# Patient Record
Sex: FEMALE | Race: BLACK OR AFRICAN AMERICAN | NOT HISPANIC OR LATINO | Employment: UNEMPLOYED | URBAN - METROPOLITAN AREA
[De-identification: names, ages, dates, MRNs, and addresses within clinical notes are randomized per-mention and may not be internally consistent; named-entity substitution may affect disease eponyms.]

---
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
## 2021-06-02 ENCOUNTER — RECORDS - HEALTHEAST (OUTPATIENT)
Dept: ADMINISTRATIVE | Facility: CLINIC | Age: 21
End: 2021-06-02

## 2021-06-13 NOTE — PROGRESS NOTES
"S:  19 y/o female presented to the [facility] ED with SI and psychosis.    B: No known hx of MH.  BIB friends due to pt making suicidal statements and altered mental status.  Denied substance use.  Collateral obtained from friends and they reported it is unknown if she used substances and that pt endorsed visual and auditory hallucinations.  Pt is extremely paranoid and has made statements about the federal government being \"out to get her.\"  Pt has been spitting, kicking and screaming, requiring the use of IM medications and restraints for safety.      A: 72 hr hold.  No medical concerns. COVID has been collected.  No reported sx.  Drug screen has been ordered.  Negative for alcohol.     R: CSW recommends re-evaluation once pt is able to participate and drug screen to rule out substance use, if possible.  Placed on waitlist pending bed availability.  "

## 2021-06-16 PROBLEM — F39: Status: ACTIVE | Noted: 2020-11-27

## 2021-06-16 PROBLEM — R45.851 SUICIDAL IDEATION: Status: ACTIVE | Noted: 2020-11-24

## 2021-06-16 PROBLEM — F29 PSYCHOSIS (H): Status: ACTIVE | Noted: 2020-11-24

## 2021-08-24 ENCOUNTER — HOSPITAL ENCOUNTER (EMERGENCY)
Facility: CLINIC | Age: 21
Discharge: HOME | End: 2021-08-24
Admitting: EMERGENCY MEDICINE
Payer: COMMERCIAL

## 2021-08-24 VITALS
OXYGEN SATURATION: 98 % | SYSTOLIC BLOOD PRESSURE: 109 MMHG | DIASTOLIC BLOOD PRESSURE: 68 MMHG | BODY MASS INDEX: 25.1 KG/M2 | HEART RATE: 94 BPM | WEIGHT: 170 LBS | TEMPERATURE: 97 F | RESPIRATION RATE: 16 BRPM

## 2021-08-24 DIAGNOSIS — N64.52 BREAST DISCHARGE: ICD-10-CM

## 2021-08-24 LAB
HCG UR QL: NEGATIVE
INTERNAL QC OK POCT: NORMAL

## 2021-08-24 PROCEDURE — 81025 URINE PREGNANCY TEST: CPT | Performed by: EMERGENCY MEDICINE

## 2021-08-24 PROCEDURE — 99282 EMERGENCY DEPT VISIT SF MDM: CPT

## 2021-08-24 NOTE — ED TRIAGE NOTES
The patient presents to the ED because she thinks she might be pregnant. Last menstrual period was in February. She reports milk coming out from her breasts. She has no concerns other than possible pregnancy at this time.

## 2021-08-25 NOTE — ED PROVIDER NOTES
EMERGENCY DEPARTMENT ENCOUNTER      NAME: Rome Jensen  AGE: 21 year old female  YOB: 2000  MRN: [id number]  EVALUATION DATE & TIME: 8/24/2021  7:15 PM    PCP: No primary care provider on file.    ED PROVIDER: Osiris Rodriguez PA-C      Chief Complaint   Patient presents with     Pregnancy Possible (Cpm)         FINAL IMPRESSION:  1. Breast discharge          ED COURSE & MEDICAL DECISION MAKING:    Pertinent Labs & Imaging studies reviewed. (See chart for details)    21 year old female presents to the Emergency Department for evaluation of nipple discharge.    Physical exam is remarkable for a generally well-appearing female who is in no acute distress.  She has an unremarkable breast exam with no tenderness to palpation, masses, erythema, or warmth.  I do not appreciate any discharge from either nipple on my exam.  Abdomen is soft and nontender.  Vital signs are stable and she is afebrile.    Pregnancy test was negative.    I do not think any further emergent labs or imaging are indicated at this time.  Patient had multiple negative pregnancy test at home and her test here is negative.  Patient's mother requesting a blood pregnancy test which I advised her is not emergently indicated given multiple other negative tests.  I advised the patient that she needs to follow-up with her primary care provider as soon as possible as I suspect a likely endocrine cause of nipple discharge given those symptoms as well as her amenorrhea.  Recommend return to the ER if she develops any new or worsening symptoms like severe pain, fever, persistent vomiting, difficulty breathing, or any other concerning symptoms.  Patient is amenable to this treatment plan and verbalized her understanding.    ED Course   7:19 PM Performed my initial history and physical exam. Discussed workup in the emergency department, management of symptoms, and likely disposition.  7:40 PM I discussed the plan for discharge with the patient, and  patient is agreeable. We discussed supportive cares at home and reasons for return to the ER including new or worsening symptoms - all questions and concerns addressed. Patient to be discharged by RN.    At the conclusion of the encounter I discussed the results of all of the tests and the disposition. The questions were answered. The patient or family acknowledged understanding and was agreeable with the care plan.     Voice recognition software was used in the creation of this note. Any grammatical or nonsensical errors are due to inherent errors with the software and are not the intention of the writer.     MEDICATIONS GIVEN IN THE EMERGENCY:  Medications - No data to display    NEW PRESCRIPTIONS STARTED AT TODAY'S ER VISIT  Discharge Medication List as of 8/24/2021  7:44 PM               =================================================================    HPI    Patient information was obtained from: Patient    Use of Intrepreter: N/A         Rome Jensen is a 21 year old female with a history of asthma, bipolar disorder, and psychosis who presents to the ED by walk in for evaluation of possible pregnancy.    The patient presents with concern she may be pregnant. She reports she has not had her period since February. She states having an irregular period is abnormal for her. She has taken three at-home pregnancy tests with negative results. She also reports yellowish breast discharge from her bilateral nipples. The discharge has been occurring for the past couple days. She reports no history of similar discharge. She denies any other medical problems. She denies breast pain, fever, chills, shortness of breath, chest pain, or any other complaints at this time.    REVIEW OF SYSTEMS   Constitutional:  No reported fever, chills  Respiratory: No reported SOB, cough  Cardiovascular:  No reported CP  GI:  No reported abdominal pain, nausea, vomiting  : Denies vaginal bleeding  Skin:  No reported rash, breast pain.  Endorses nipple discharge     All other systems reviewed and are negative unless noted in HPI.      PAST MEDICAL HISTORY:  Past Medical History:   Diagnosis Date     Asthma        PAST SURGICAL HISTORY:  History reviewed. No pertinent surgical history.    CURRENT MEDICATIONS:    albuterol (PROAIR HFA;PROVENTIL HFA;VENTOLIN HFA) 90 mcg/actuation inhaler  ascorbic acid, vitamin C, (ASCORBIC ACID WITH MERY HIPS) 500 MG tablet  cholecalciferol, vitamin D3, 1,000 unit (25 mcg) tablet  fluticasone furoate-vilanteroL (BREO ELLIPTA) 100-25 mcg/dose inhaler  melatonin 12 mg Tab  multivitamin with minerals (THERA-M) 9 mg iron-400 mcg Tab tablet  QUEtiapine (SEROQUEL) 25 MG tablet  risperiDONE (RISPERDAL) 3 MG tablet        ALLERGIES:  Allergies   Allergen Reactions     Amoxicillin Hives       FAMILY HISTORY:  History reviewed. No pertinent family history.    SOCIAL HISTORY:   Social History     Socioeconomic History     Marital status: Single     Spouse name: Not on file     Number of children: Not on file     Years of education: Not on file     Highest education level: Not on file   Occupational History     Not on file   Tobacco Use     Smoking status: Never Smoker     Smokeless tobacco: Never Used   Substance and Sexual Activity     Alcohol use: Yes     Alcohol/week: 19.0 standard drinks     Drug use: Yes     Types: Marijuana     Sexual activity: Yes     Partners: Male     Birth control/protection: None   Other Topics Concern     Not on file   Social History Narrative     Not on file     Social Determinants of Health     Financial Resource Strain:      Difficulty of Paying Living Expenses:    Food Insecurity:      Worried About Running Out of Food in the Last Year:      Ran Out of Food in the Last Year:    Transportation Needs:      Lack of Transportation (Medical):      Lack of Transportation (Non-Medical):    Physical Activity:      Days of Exercise per Week:      Minutes of Exercise per Session:    Stress:      Feeling  of Stress :    Social Connections:      Frequency of Communication with Friends and Family:      Frequency of Social Gatherings with Friends and Family:      Attends [employer] Services:      Active Member of Clubs or Organizations:      Attends Club or Organization Meetings:      Marital Status:    Intimate Partner Violence:      Fear of Current or Ex-Partner:      Emotionally Abused:      Physically Abused:      Sexually Abused:        VITALS:  Patient Vitals for the past 24 hrs:   BP Temp Pulse Resp SpO2 Weight   08/24/21 1758 109/68 97  F (36.1  C) 94 16 98 % 77.1 kg (170 lb)       PHYSICAL EXAM    VITAL SIGNS: /68   Pulse 94   Temp 97  F (36.1  C)   Resp 16   Wt 77.1 kg (170 lb)   LMP 02/05/2021   SpO2 98%   BMI 25.10 kg/m    General Appearance: Alert, cooperative, normal speech and facial symmetry, appears stated age, the patient does not appear in distress  Head:  Normocephalic, without obvious abnormality, atraumatic  Chest:  Unremarkable breast exam with no tenderness to palpation, masses, erythema, or warmth bilaterally.  No discharge from either nipple on my exam even with expression.  Abdomen:  Abdomen is soft, non-distended with no tenderness to palpation, rebound tenderness, or guarding.   Neuro: Patient is awake, alert, and responsive to voice. No gross motor weaknesses or sensory loss; moves all extremities.     LAB:  All pertinent labs reviewed and interpreted.  Labs Ordered and Resulted from Time of ED Arrival Up to the Time of Departure from the ED   HCG QUALITATIVE URINE POCT - Normal       RADIOLOGY:  Reviewed all pertinent imaging. Please see official radiology report.  No orders to display       De ESTEBAN, am serving as a scribe to document services personally performed by Osiris Rodriguez PA-C based on my observation and the provider's statements to me. IOsiris PA-C attest that De Ocampo is acting in a scribe capacity, has observed my performance of the  services and has documented them in accordance with my direction.     Osiris Rodriguez PA-C  Emergency Medicine  [facility] EMERGENCY ROOM  [address] [id number] [phone]  Dept: [phone]     Osiris Rodriguez PA-C  08/24/21 2024

## 2021-08-25 NOTE — DISCHARGE INSTRUCTIONS
You were seen in the emergency department today for evaluation of concern for possible pregnancy.  Your pregnancy test was negative today.    I do not believe your breast discharge is related to pregnancy.  Follow-up with your primary care provider to have lab work done as there are many other possible causes of breast discharge.    Return to the ER if you develop any new or worsening symptoms like fever, persistent vomiting, severe breast pain, or any other symptoms that concern you.

## 2021-11-13 ENCOUNTER — HOSPITAL ENCOUNTER (EMERGENCY)
Facility: CLINIC | Age: 21
Discharge: HOME | End: 2021-11-14
Attending: EMERGENCY MEDICINE
Payer: COMMERCIAL

## 2021-11-13 ENCOUNTER — APPOINTMENT (OUTPATIENT)
Dept: GENERAL RADIOLOGY | Facility: CLINIC | Age: 21
End: 2021-11-13
Attending: EMERGENCY MEDICINE
Payer: COMMERCIAL

## 2021-11-13 DIAGNOSIS — S09.90XA CLOSED HEAD INJURY, INITIAL ENCOUNTER: ICD-10-CM
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
## 2021-11-13 DIAGNOSIS — S43.102A ACROMIOCLAVICULAR SEPARATION, TYPE 1, LEFT, INITIAL ENCOUNTER: ICD-10-CM

## 2021-11-13 PROCEDURE — 99284 EMERGENCY DEPT VISIT MOD MDM: CPT

## 2021-11-13 PROCEDURE — 73030 X-RAY EXAM OF SHOULDER: CPT | Mod: LT

## 2021-11-13 PROCEDURE — 99284 EMERGENCY DEPT VISIT MOD MDM: CPT | Performed by: EMERGENCY MEDICINE

## 2021-11-14 ENCOUNTER — APPOINTMENT (OUTPATIENT)
Dept: GENERAL RADIOLOGY | Facility: CLINIC | Age: 21
End: 2021-11-14
Attending: EMERGENCY MEDICINE
Payer: COMMERCIAL

## 2021-11-14 VITALS
SYSTOLIC BLOOD PRESSURE: 110 MMHG | RESPIRATION RATE: 18 BRPM | BODY MASS INDEX: 25.84 KG/M2 | OXYGEN SATURATION: 98 % | HEART RATE: 85 BPM | WEIGHT: 175 LBS | TEMPERATURE: 98.5 F | DIASTOLIC BLOOD PRESSURE: 74 MMHG

## 2021-11-14 PROCEDURE — 73090 X-RAY EXAM OF FOREARM: CPT | Mod: RT

## 2021-11-14 PROCEDURE — 250N000013 HC RX MED GY IP 250 OP 250 PS 637: Performed by: EMERGENCY MEDICINE

## 2021-11-14 RX ORDER — ACETAMINOPHEN 500 MG
1000 TABLET ORAL ONCE
Status: COMPLETED | OUTPATIENT
Start: 2021-11-14 | End: 2021-11-14

## 2021-11-14 RX ADMIN — ACETAMINOPHEN 1000 MG: 500 TABLET ORAL at 00:48
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
## 2022-09-30 ENCOUNTER — APPOINTMENT (OUTPATIENT)
Dept: GENERAL RADIOLOGY | Facility: CLINIC | Age: 22
End: 2022-09-30
Attending: EMERGENCY MEDICINE
Payer: COMMERCIAL

## 2022-09-30 ENCOUNTER — APPOINTMENT (OUTPATIENT)
Dept: CT IMAGING | Facility: CLINIC | Age: 22
End: 2022-09-30
Payer: COMMERCIAL

## 2022-09-30 ENCOUNTER — HOSPITAL ENCOUNTER (EMERGENCY)
Facility: CLINIC | Age: 22
Discharge: OTHER INSTITUTION | End: 2022-09-30
Attending: EMERGENCY MEDICINE | Admitting: EMERGENCY MEDICINE
Payer: COMMERCIAL

## 2022-09-30 ENCOUNTER — APPOINTMENT (OUTPATIENT)
Dept: CT IMAGING | Facility: CLINIC | Age: 22
End: 2022-09-30
Attending: EMERGENCY MEDICINE
Payer: COMMERCIAL

## 2022-09-30 ENCOUNTER — HOSPITAL ENCOUNTER (EMERGENCY)
Facility: CLINIC | Age: 22
Discharge: HOME | End: 2022-10-01
Attending: INTERNAL MEDICINE | Admitting: INTERNAL MEDICINE
Payer: COMMERCIAL

## 2022-09-30 VITALS
TEMPERATURE: 98.6 F | HEART RATE: 62 BPM | OXYGEN SATURATION: 98 % | RESPIRATION RATE: 14 BRPM | BODY MASS INDEX: 23.11 KG/M2 | SYSTOLIC BLOOD PRESSURE: 112 MMHG | HEIGHT: 69 IN | DIASTOLIC BLOOD PRESSURE: 68 MMHG | WEIGHT: 156 LBS

## 2022-09-30 VITALS
OXYGEN SATURATION: 99 % | HEART RATE: 78 BPM | SYSTOLIC BLOOD PRESSURE: 108 MMHG | RESPIRATION RATE: 16 BRPM | DIASTOLIC BLOOD PRESSURE: 76 MMHG

## 2022-09-30 DIAGNOSIS — S02.609A: ICD-10-CM

## 2022-09-30 DIAGNOSIS — R07.89 OTHER CHEST PAIN: ICD-10-CM

## 2022-09-30 DIAGNOSIS — S01.511A LIP LACERATION, INITIAL ENCOUNTER: ICD-10-CM
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
## 2022-09-30 DIAGNOSIS — S09.93XA DENTAL TRAUMA, INITIAL ENCOUNTER: ICD-10-CM
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
## 2022-09-30 LAB
HOLD SPECIMEN: NORMAL
TROPONIN T SERPL HS-MCNC: <6 NG/L

## 2022-09-30 PROCEDURE — 84484 ASSAY OF TROPONIN QUANT: CPT | Performed by: EMERGENCY MEDICINE

## 2022-09-30 PROCEDURE — 70450 CT HEAD/BRAIN W/O DYE: CPT

## 2022-09-30 PROCEDURE — 71046 X-RAY EXAM CHEST 2 VIEWS: CPT

## 2022-09-30 PROCEDURE — 99285 EMERGENCY DEPT VISIT HI MDM: CPT | Mod: 25

## 2022-09-30 PROCEDURE — 99285 EMERGENCY DEPT VISIT HI MDM: CPT | Mod: 25 | Performed by: INTERNAL MEDICINE

## 2022-09-30 PROCEDURE — 70486 CT MAXILLOFACIAL W/O DYE: CPT

## 2022-09-30 PROCEDURE — 36415 COLL VENOUS BLD VENIPUNCTURE: CPT | Performed by: EMERGENCY MEDICINE

## 2022-09-30 PROCEDURE — 71046 X-RAY EXAM CHEST 2 VIEWS: CPT | Mod: 26 | Performed by: RADIOLOGY

## 2022-09-30 PROCEDURE — 93010 ELECTROCARDIOGRAM REPORT: CPT | Performed by: INTERNAL MEDICINE

## 2022-09-30 PROCEDURE — 250N000013 HC RX MED GY IP 250 OP 250 PS 637: Performed by: EMERGENCY MEDICINE

## 2022-09-30 PROCEDURE — 93005 ELECTROCARDIOGRAM TRACING: CPT | Performed by: INTERNAL MEDICINE

## 2022-09-30 RX ORDER — ACETAMINOPHEN 325 MG/1
975 TABLET ORAL ONCE
Status: COMPLETED | OUTPATIENT
Start: 2022-09-30 | End: 2022-09-30

## 2022-09-30 RX ORDER — CLINDAMYCIN HCL 300 MG
300 CAPSULE ORAL 4 TIMES DAILY
Qty: 20 CAPSULE | Refills: 0 | Status: SHIPPED | OUTPATIENT
Start: 2022-09-30 | End: 2022-10-05

## 2022-09-30 RX ORDER — LORAZEPAM 0.5 MG/1
0.5 TABLET ORAL ONCE
Status: DISCONTINUED | OUTPATIENT
Start: 2022-09-30 | End: 2022-09-30 | Stop reason: HOSPADM

## 2022-09-30 RX ORDER — HYDROCODONE BITARTRATE AND ACETAMINOPHEN 5; 325 MG/1; MG/1
1 TABLET ORAL EVERY 6 HOURS PRN
Qty: 12 TABLET | Refills: 0 | Status: SHIPPED | OUTPATIENT
Start: 2022-09-30 | End: 2022-10-03

## 2022-09-30 RX ORDER — CHLORHEXIDINE GLUCONATE ORAL RINSE 1.2 MG/ML
15 SOLUTION DENTAL 3 TIMES DAILY
Qty: 473 ML | Refills: 0 | Status: SHIPPED | OUTPATIENT
Start: 2022-09-30

## 2022-09-30 RX ORDER — ONDANSETRON 4 MG/1
4 TABLET, ORALLY DISINTEGRATING ORAL ONCE
Status: DISCONTINUED | OUTPATIENT
Start: 2022-09-30 | End: 2022-09-30 | Stop reason: HOSPADM

## 2022-09-30 RX ADMIN — ACETAMINOPHEN 975 MG: 325 TABLET, FILM COATED ORAL at 11:45

## 2022-09-30 ASSESSMENT — ENCOUNTER SYMPTOMS
VOMITING: 0
SHORTNESS OF BREATH: 0
BACK PAIN: 0
FEVER: 0
NECK PAIN: 0
NUMBNESS: 0
SHORTNESS OF BREATH: 0
ABDOMINAL PAIN: 0
WOUND: 1
WEAKNESS: 0
HEADACHES: 0
WEAKNESS: 0
CONFUSION: 0
ABDOMINAL PAIN: 0
NECK PAIN: 0
LIGHT-HEADEDNESS: 0
BACK PAIN: 0
NUMBNESS: 0
NAUSEA: 1

## 2022-09-30 ASSESSMENT — ACTIVITIES OF DAILY LIVING (ADL)
ADLS_ACUITY_SCORE: 35
ADLS_ACUITY_SCORE: 35
ADLS_ACUITY_SCORE: 33
ADLS_ACUITY_SCORE: 35
ADLS_ACUITY_SCORE: 35

## 2022-09-30 NOTE — ED NOTES
Pt updated continuing to wait for call back from specialist regarding plan for surgical intervention today or not. Pt has been able to swallow water.  Pt continues to refuse medications and tetanus
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
## 2022-09-30 NOTE — ED NOTES
Pt refuses to swallow anything saying she is unable.  Pt given straw and talked about placing straw back further in the mouth due to dental injury but states she is unable.  Update to PA given.
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
## 2022-10-01 LAB
ATRIAL RATE - MUSE: 71 BPM
DIASTOLIC BLOOD PRESSURE - MUSE: NORMAL MMHG
INTERPRETATION ECG - MUSE: NORMAL
P AXIS - MUSE: 47 DEGREES
PR INTERVAL - MUSE: 146 MS
QRS DURATION - MUSE: 82 MS
QT - MUSE: 392 MS
QTC - MUSE: 425 MS
R AXIS - MUSE: 63 DEGREES
SYSTOLIC BLOOD PRESSURE - MUSE: NORMAL MMHG
T AXIS - MUSE: 31 DEGREES
VENTRICULAR RATE- MUSE: 71 BPM
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
## 2022-12-20 ENCOUNTER — HOSPITAL ENCOUNTER (EMERGENCY)
Facility: CLINIC | Age: 22
Discharge: LEFT BEFORE DISCHARGE | End: 2022-12-20
Attending: EMERGENCY MEDICINE

## 2022-12-20 VITALS
WEIGHT: 150 LBS | RESPIRATION RATE: 16 BRPM | SYSTOLIC BLOOD PRESSURE: 122 MMHG | TEMPERATURE: 98.2 F | OXYGEN SATURATION: 98 % | HEIGHT: 69 IN | DIASTOLIC BLOOD PRESSURE: 81 MMHG | HEART RATE: 92 BPM | BODY MASS INDEX: 22.22 KG/M2

## 2022-12-20 DIAGNOSIS — Z53.21 PATIENT LEFT WITHOUT BEING SEEN: ICD-10-CM

## 2022-12-20 ASSESSMENT — ACTIVITIES OF DAILY LIVING (ADL): ADLS_ACUITY_SCORE: 33
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
## 2022-12-21 NOTE — ED PROVIDER NOTES
Patient left prior to my evaluation.  Please disregard encounter     Croy Alva MD  12/21/22 8587

## 2023-12-02 ENCOUNTER — HOSPITAL ENCOUNTER (EMERGENCY)
Facility: CLINIC | Age: 23
Discharge: HOME | End: 2023-12-02
Attending: EMERGENCY MEDICINE | Admitting: EMERGENCY MEDICINE
Payer: COMMERCIAL

## 2023-12-02 VITALS
WEIGHT: 155 LBS | OXYGEN SATURATION: 100 % | TEMPERATURE: 98.4 F | DIASTOLIC BLOOD PRESSURE: 83 MMHG | BODY MASS INDEX: 22.96 KG/M2 | SYSTOLIC BLOOD PRESSURE: 129 MMHG | RESPIRATION RATE: 16 BRPM | HEART RATE: 98 BPM | HEIGHT: 69 IN

## 2023-12-02 DIAGNOSIS — J06.9 UPPER RESPIRATORY TRACT INFECTION, UNSPECIFIED TYPE: ICD-10-CM

## 2023-12-02 LAB
FLUAV RNA SPEC QL NAA+PROBE: NEGATIVE
FLUBV RNA RESP QL NAA+PROBE: NEGATIVE
RSV RNA SPEC NAA+PROBE: NEGATIVE
SARS-COV-2 RNA RESP QL NAA+PROBE: NEGATIVE

## 2023-12-02 PROCEDURE — 99283 EMERGENCY DEPT VISIT LOW MDM: CPT

## 2023-12-02 PROCEDURE — 87637 SARSCOV2&INF A&B&RSV AMP PRB: CPT | Performed by: EMERGENCY MEDICINE

## 2023-12-02 ASSESSMENT — ACTIVITIES OF DAILY LIVING (ADL): ADLS_ACUITY_SCORE: 35

## 2023-12-02 NOTE — ED TRIAGE NOTES
Patient presents to ED with cough and fevers that began this morning @0400, patient also reports some anxiety and is taking her anxiety meds.  Naomi Romeo RN.......12/2/2023 8:53 AM     Triage Assessment (Adult)       Row Name 12/02/23 0852          Triage Assessment    Airway WDL WDL        Respiratory WDL    Respiratory WDL WDL        Skin Circulation/Temperature WDL    Skin Circulation/Temperature WDL WDL        Cardiac WDL    Cardiac WDL WDL        Peripheral/Neurovascular WDL    Peripheral Neurovascular WDL WDL        Cognitive/Neuro/Behavioral WDL    Cognitive/Neuro/Behavioral WDL WDL

## 2023-12-02 NOTE — ED PROVIDER NOTES
"EMERGENCY DEPARTMENT ENCOUNTER      NAME: Rome Jensen  AGE: 23 year old female  YOB: 2000  MRN: [id number]  EVALUATION DATE & TIME: 12/2/2023  8:37 AM    PCP: Avila Villagran    ED PROVIDER: Manoj Carrasco M.D.      Chief Complaint   Patient presents with    Anxiety    Cough         FINAL IMPRESSION:  URI      ED COURSE & MEDICAL DECISION MAKING:    Pertinent Labs & Imaging studies reviewed. (See chart for details)  23 year old female presents to the Emergency Department for evaluation of cough and general achiness.  Symptoms started in the early morning hours.  Also has been feeling \"hot and cold\".  Concerned it may represent a complication of a root canal earlier this week.  Review of records indicate patient with underlying anxiety issues.  Vital signs are unremarkable exam is benign.  Oropharynx is noninjected.  Neck is supple.  Lungs are clear.  Card exam unremarkable.  Will proceed with viral testing out of caution.  No indications for other laboratory evaluation nor imaging patient appears non toxic with stable vitals signs. Overall exam is benign.  Patient reports being unvaccinated for COVID/influenza    8:40 AM I met with the patient for the initial interview and physical examination. Discussed plan for treatment and workup in the ED.    9:51 AM.  Viral swab negative for COVID/RSV/influenza.  Patient likely with unspecified viral URI.  Patient reassured and discharged.  At the conclusion of the encounter I discussed the results of all of the tests and the disposition. The questions were answered and return precautions provided. The patient or family acknowledged understanding and was agreeable with the care plan.       PPE: Provider  paper mask.     Medical Decision Making    History:  Supplemental history from: N/A  External Record(s) reviewed: Documented in chart, if applicable.    Work Up:  Chart documentation includes differential considered and any EKGs or imaging independently " interpreted by provider, where specified.  In additional to work up documented, I considered the following work up: Documented in chart, if applicable.    External consultation:  Discussion of management with another provider:     Complicating factors:  Care impacted by chronic illness: Mental Health and Other: asthma  Care affected by social determinants of health: N/A    Disposition considerations: Discharge. No recommendations on prescription strength medication(s). See documentation for any additional details.       MEDICATIONS GIVEN IN THE EMERGENCY:  Medications - No data to display    NEW PRESCRIPTIONS STARTED AT TODAY'S ER VISIT  New Prescriptions    No medications on file          =================================================================    HPI    Patient information was obtained from: Patient    Use of Intrepreter: N/A         Rome Jensen is a 23 year old female with a pertient medical history of asthma, Bipolar 1 disorder, and psychosis who presents to the ED for evaluation of anxiety and cough.    Patient reports a cough and feeling hot and cold starting around 4 AM this morning. She notes that she had a root canal 3 days ago and could not take her antibiotics because she was allergic to them. She denies taking her temperature. No other complaints at this time.       REVIEW OF SYSTEMS   Constitutional:  Denies fever, chills  Respiratory:  Denies productive cough or increased work of breathing  Cardiovascular:  Denies chest pain, palpitations  GI:  Denies abdominal pain, nausea, vomiting, or change in bowel or bladder habits   Musculoskeletal:  Denies any new muscle/joint swelling  Skin:  Denies rash   Neurologic:  Denies focal weakness  All systems negative except as marked.     PAST MEDICAL HISTORY:  Past Medical History:   Diagnosis Date    Asthma        PAST SURGICAL HISTORY:  No past surgical history on file.      CURRENT MEDICATIONS:    No current facility-administered medications for this  encounter.    Current Outpatient Medications:     chlorhexidine (PERIDEX) 0.12 % solution, Swish and spit 15 mLs in mouth 3 times daily, Disp: 473 mL, Rfl: 0    ALLERGIES:  Allergies   Allergen Reactions    Amoxicillin Hives       FAMILY HISTORY:  No family history on file.    SOCIAL HISTORY:   Social History     Socioeconomic History    Marital status: Single   Tobacco Use    Smoking status: Every Day     Types: Vaping Device    Smokeless tobacco: Never   Substance and Sexual Activity    Alcohol use: Not Currently     Alcohol/week: 19.0 standard drinks of alcohol    Drug use: Not Currently     Types: Marijuana    Sexual activity: Yes     Partners: Male     Birth control/protection: None       VITALS:  No data found.     PHYSICAL EXAM    Constitutional:  Awake, alert, Anxious appearing.  HENT:  Normocephalic, Atraumatic. Bilateral external ears normal. Oropharynx moist. Nose normal. Neck- Normal range of motion with no guarding,  Supple, No stridor.   Eyes:  PERRL, EOMI with no signs of entrapment, Conjunctiva normal, No discharge.   Respiratory:  Normal breath sounds, No respiratory distress, No wheezing.    Cardiovascular:  Normal heart rate, Normal rhythm, No appreciable rubs or gallops.   GI:  Soft, No tenderness, No distension, No palpable masses  Musculoskeletal:  No edema. Good range of motion in all major joints. No tenderness to palpation or major deformities noted.  Integument:  Warm, Dry, No erythema, No rash.   Neurologic:  Alert & oriented, Normal motor function, Normal sensory function, No focal deficits noted.   Psychiatric:  Affect normal, Judgment normal, Mood normal.     LAB:  All pertinent labs reviewed and interpreted.  Results for orders placed or performed during the hospital encounter of 12/02/23   Symptomatic Influenza A/B, RSV, & SARS-CoV2 PCR (COVID-19) Nasopharyngeal     Status: Normal    Specimen: Nasopharyngeal; Swab   Result Value Ref Range    Influenza A PCR Negative Negative     Influenza B PCR Negative Negative    RSV PCR Negative Negative    SARS CoV2 PCR Negative Negative    Narrative    Testing was performed using the Xpert Xpress CoV2/Flu/RSV Assay on the [employer] GeneXpert Instrument. This test should be ordered for the detection of SARS-CoV-2, influenza, and RSV viruses in individuals who meet clinical and/or epidemiological criteria. Test performance is unknown in asymptomatic patients. This test is for in vitro diagnostic use under the FDA EUA for laboratories certified under CLIA to perform high or moderate complexity testing. This test has not been FDA cleared or approved. A negative result does not rule out the presence of PCR inhibitors in the specimen or target RNA in concentration below the limit of detection for the assay. If only one viral target is positive but coinfection with multiple targets is suspected, the sample should be re-tested with another FDA cleared, approved, or authorized test, if coinfection would change clinical management. This test was validated by the [facility]. These laboratories are certified under the Clinical Laboratory Improvement Amendments of 1988 (CLIA-88) as qualified to perform high complexity laboratory testing.               I, Roma Ibanez, am serving as a scribe to document services personally performed by Manoj Carrasco MD, based on my observation and the provider's statements to me. I, Manoj Carrasco MD attest that Roma Ibanez is acting in a scribe capacity, has observed my performance of the services and has documented them in accordance with my direction.    Manoj Carrasco M.D.  Emergency Medicine  [facility] EMERGENCY ROOM     Manoj Carrasco MD  12/02/23 0952

## 2023-12-02 NOTE — ED NOTES
Introduced self to patient.  Whiteboard updated.  Plan of care and length of time discussed with patient.  Will continue to monitor. Naomi Romeo RN.......12/2/2023 8:57 AM

## 2023-12-21 NOTE — DISCHARGE INSTRUCTIONS
Soft diet. DO NOT BITE WITH YOUR front teeth. Eat soft foods and chew on your back teeth on the sides.  Clindamycin 300 mg 4 times/ day for 5 days.  Peridex mouthwash swish and spit 4 times/ day.  Vicodin 1 tablet every 6 hours as needed for pain.  Follow up with your regular dentist next week. (There is a strong chance that the teeth will not survive due to the extent of the damage to their roots.   Please make an appointment to follow up with [facility] (phone: [phone]) in 5-7 days if unable to see your regualr dentist.    [Annual] : an annual visit.

## 2024-06-22 ENCOUNTER — HOSPITAL ENCOUNTER (EMERGENCY)
Facility: CLINIC | Age: 24
Discharge: HOME | End: 2024-06-22
Attending: EMERGENCY MEDICINE | Admitting: EMERGENCY MEDICINE
Payer: COMMERCIAL

## 2024-06-22 VITALS
TEMPERATURE: 99 F | HEIGHT: 69 IN | RESPIRATION RATE: 18 BRPM | SYSTOLIC BLOOD PRESSURE: 110 MMHG | OXYGEN SATURATION: 100 % | DIASTOLIC BLOOD PRESSURE: 68 MMHG | BODY MASS INDEX: 23.7 KG/M2 | HEART RATE: 74 BPM | WEIGHT: 160 LBS

## 2024-06-22 DIAGNOSIS — A74.9 CHLAMYDIA INFECTION: ICD-10-CM

## 2024-06-22 LAB
ALBUMIN UR-MCNC: NEGATIVE MG/DL
APPEARANCE UR: CLEAR
BACTERIA #/AREA URNS HPF: ABNORMAL /HPF
BILIRUB UR QL STRIP: NEGATIVE
CLUE CELLS: ABNORMAL
COLOR UR AUTO: ABNORMAL
GLUCOSE UR STRIP-MCNC: NEGATIVE MG/DL
HCG UR QL: NEGATIVE
HGB UR QL STRIP: NEGATIVE
HIV 1+2 AB+HIV1 P24 AG SERPL QL IA: NONREACTIVE
KETONES UR STRIP-MCNC: NEGATIVE MG/DL
LEUKOCYTE ESTERASE UR QL STRIP: NEGATIVE
MUCOUS THREADS #/AREA URNS LPF: PRESENT /LPF
NITRATE UR QL: NEGATIVE
PH UR STRIP: 7 [PH] (ref 5–7)
RBC URINE: 1 /HPF
SP GR UR STRIP: 1.01 (ref 1–1.03)
SQUAMOUS EPITHELIAL: 2 /HPF
TRICHOMONAS, WET PREP: ABNORMAL
UROBILINOGEN UR STRIP-MCNC: <2 MG/DL
WBC URINE: 2 /HPF
WBC'S/HIGH POWER FIELD, WET PREP: ABNORMAL
YEAST, WET PREP: ABNORMAL

## 2024-06-22 PROCEDURE — 87389 HIV-1 AG W/HIV-1&-2 AB AG IA: CPT | Performed by: EMERGENCY MEDICINE

## 2024-06-22 PROCEDURE — 250N000013 HC RX MED GY IP 250 OP 250 PS 637: Performed by: EMERGENCY MEDICINE

## 2024-06-22 PROCEDURE — 36415 COLL VENOUS BLD VENIPUNCTURE: CPT | Performed by: EMERGENCY MEDICINE

## 2024-06-22 PROCEDURE — 87210 SMEAR WET MOUNT SALINE/INK: CPT

## 2024-06-22 PROCEDURE — 99283 EMERGENCY DEPT VISIT LOW MDM: CPT

## 2024-06-22 PROCEDURE — 81003 URINALYSIS AUTO W/O SCOPE: CPT | Performed by: EMERGENCY MEDICINE

## 2024-06-22 PROCEDURE — 86780 TREPONEMA PALLIDUM: CPT | Performed by: EMERGENCY MEDICINE

## 2024-06-22 PROCEDURE — 81025 URINE PREGNANCY TEST: CPT | Performed by: EMERGENCY MEDICINE

## 2024-06-22 RX ORDER — DOXYCYCLINE 100 MG/1
100 CAPSULE ORAL ONCE
Status: COMPLETED | OUTPATIENT
Start: 2024-06-22 | End: 2024-06-22

## 2024-06-22 RX ORDER — DOXYCYCLINE 100 MG/1
100 CAPSULE ORAL 2 TIMES DAILY
Qty: 14 CAPSULE | Refills: 0 | Status: SHIPPED | OUTPATIENT
Start: 2024-06-22 | End: 2024-06-29

## 2024-06-22 RX ADMIN — DOXYCYCLINE HYCLATE 100 MG: 100 CAPSULE ORAL at 13:34

## 2024-06-22 ASSESSMENT — COLUMBIA-SUICIDE SEVERITY RATING SCALE - C-SSRS
1. IN THE PAST MONTH, HAVE YOU WISHED YOU WERE DEAD OR WISHED YOU COULD GO TO SLEEP AND NOT WAKE UP?: NO
6. HAVE YOU EVER DONE ANYTHING, STARTED TO DO ANYTHING, OR PREPARED TO DO ANYTHING TO END YOUR LIFE?: NO
2. HAVE YOU ACTUALLY HAD ANY THOUGHTS OF KILLING YOURSELF IN THE PAST MONTH?: NO

## 2024-06-22 ASSESSMENT — ACTIVITIES OF DAILY LIVING (ADL)
ADLS_ACUITY_SCORE: 35

## 2024-06-22 NOTE — ED PROVIDER NOTES
"Emergency Department Midlevel Supervisory Note     I had a face to face encounter with this patient seen by the Advanced Practice Provider (ANGELA). I personally made/approved the management plan and take responsibility for the patient management. I personally saw patient and performed a substantive portion of the visit including all aspects of the medical decision making.     ED Course:  12:44 Juliann Alberto PA-C staffed patient with me. I agree with their assessment and plan of management, and I will see the patient.  12:58 I met with the patient to introduce myself, gather additional history, perform my initial exam, and discuss the plan.   1:42 Performed pelvic exam.     Brief HPI:     Rome Jensen is a 24 year old female who presents for evaluation of medication refill and finger injury.     Seen 2 days ago for dysuria, vaginal discharge, vaginal itching, and suprapubic abdominal pain. Symptoms started Tuesday. LMP 1 week ago. Chlamydia came back positive yesterday. No answer from the hospital so has not started on antibiotics. Urine negative for UTI or blood. Declined pelvic at that time so self swab for GC/CL. No other complaints or concerns.     I, Karissa Cisneros, am serving as a scribe to document services personally performed by Sona Diamond MD, based on my observations and the provider's statements to me.   I, Sona Diamond MD attest that Karissa Cisneros was acting in a scribe capacity, has observed my performance of the services and has documented them in accordance with my direction.    Brief Physical Exam: /57   Pulse 93   Temp 99  F (37.2  C) (Oral)   Resp 18   Ht 1.753 m (5' 9\")   Wt 72.6 kg (160 lb)   LMP 06/04/2024 (Approximate)   SpO2 97%   BMI 23.63 kg/m    Constitutional:  Alert, in no acute distress  EYES: Conjunctivae clear  HENT:  Atraumatic  Respiratory:  Respirations even, unlabored, in no acute respiratory distress  Cardiovascular:  Regular rate and rhythm, good peripheral " perfusion  GI: Soft, non-distended, non-tender  Musculoskeletal:  Moves all 4 extremities equally, grossly symmetrical strength  Integument: Warm & dry. No appreciable rash, erythema.  Neurologic:  Alert & oriented, speech clear and fluent, no focal deficits noted  Psych: Normal mood and affect       MDM:  ***       No diagnosis found.    Consults:  I discussed the patient with *** who recommends ***    Labs and Imaging:       I have reviewed the relevant laboratory studies above.    I independently interpreted the following imaging study(s):   ***    EKG: I reviewed and independently interpreted the patient's EKG, with comments made as listed below. Please see scanned EKG for full report.   ***    Procedures:  I was present for the key portions of procedures documented in ANGELA/midlevel note, see midlevel note for further details.    Sona Diamond MD  [facility] EMERGENCY ROOM  [address] [address] [phone]

## 2024-06-22 NOTE — ED PROVIDER NOTES
"Emergency Department Encounter   NAME: Rome Jensen ; AGE: 24 year old female ; YOB: 2000 ; MRN: [id number] ; PCP: Avila Villagran   ED PROVIDER: Juliann Alberto PA-C    Evaluation Date & Time:   6/22/2024 11:59 AM    CHIEF COMPLAINT:  Dysuria and Abdominal Pain      Impression and Plan      Rome Jensen is a 24 year old female who presents for evaluation of vaginal discharge, vaginal itching, and suprapubic abdominal pain for the past 4 days.  Was seen in the ER 2 days ago and had a urinalysis as well as GC chlamydia testing.  She saw on mychart that her chlamydia test was positive.  No one sent her with antibiotics and given that her symptoms or not getting better she arrived here in the ER for a medication refill. Also cut her right pinky 2 weeks ago with a knife.  She reports that she could see \"white stuff\" underneath. Was not seen for this. Reports that since that injury she has not been able to bend the tip of her finger.     Vitals unremarkable.  She does not have any SIRS criteria.  She is afebrile and not tachycardic.  On examination, well-appearing female in no acute distress.  Abdomen with mild tenderness palpation suprapubically.  No guarding or rebound.  Pelvic exam performed by myself reveals copious white discharge in the vaginal vault.  Cervix looks unremarkable.  No significant tenderness with insertion of the speculum or on examination. No CMT. No foreign body.    With a known positive chlamydia, concerned about PID initially.  But then after pelvic examination, she had no cervical motion tenderness and her abdomen is just mildly tender to palpation.  I still offered her a pelvic ultrasound to assess for this and patient declined. Pregnancy test negative.  HIV and syphilis pending.  Urinalysis unremarkable without infection so cystitis/UTI unlikely. Wet prep negative for BV, Tric, or yeast.  I do not suspect any other intra-abdominal pathology given that her exam is still " benign and she is not having any pinpoint tenderness in the right lower quadrant, right upper quadrant, or any associated symptoms including fevers, nausea, vomiting, diarrhea.    At this point, will treat on an outpatient basis for chlamydia.  She is not showing any signs of PID at this point.  She is afebrile and not tachycardic.  Overall well-appearing with mild tenderness palpation suprapubically.  She has no CMT.  Will give her first dose of doxycycline here and send her home with 7 days.  Discussed return precautions.  All questions answered.  Discharged in stable condition.    Medical Decision Making  Obtained supplemental history:Supplemental history obtained?: No  Reviewed external records: External records reviewed?: No  Care impacted by chronic illness:N/A  Care significantly affected by social determinants of health:N/A  Did you consider but not order tests?: Work up considered but not performed and documented in chart, if applicable  Did you interpret images independently?: Independent interpretation of ECG and images noted in documentation, when applicable.  Consultation discussion with other provider:Did you involve another provider (consultant, , pharmacy, etc.)?: I discussed the care with another health care provider, see documentation for details.  Discharge. I prescribed additional prescription strength medication(s) as charted. See documentation for any additional details.    0 minutes of critical care time    ED Course:  1210 I met and introduced myself to the patient. I gathered initial history and performed my physical exam. We discussed plan for initial workup.   1245 I have staffed the patient with Dr. Diamond, ED MD, who has evaluated the patient and agrees with all aspects of today's care.       1415 We discussed the plan for discharge and the patient is agreeable. Reviewed supportive cares, symptomatic treatment, outpatient follow up, and reasons to return to the Emergency Department.  "Patient was discharged by ED RN in stable condition but instructed to return to the emergency department with any new or worsening of symptoms. Patient expressed understanding, feels comfortable, and is in agreement with this plan. All questions addressed prior to discharge.    FINAL IMPRESSION:    ICD-10-CM    1. Chlamydia infection  A74.9           MEDICATIONS GIVEN IN THE EMERGENCY DEPARTMENT:  Medications   doxycycline hyclate (VIBRAMYCIN) capsule 100 mg (100 mg Oral $Given 6/22/24 3484)       NEW PRESCRIPTIONS STARTED AT TODAY'S ED VISIT:  Discharge Medication List as of 6/22/2024  2:31 PM        START taking these medications    Details   doxycycline hyclate (VIBRAMYCIN) 100 MG capsule Take 1 capsule (100 mg) by mouth 2 times daily for 7 days, Disp-14 capsule, R-0, E-Prescribe             HPI   Patient information was obtained from: patient   Use of Intrepreter: N/A     Rome Jensen is a 24 year old female with a pertinent history of mood disorders, bipolar disorder, and psychosis who presents to the ED by foot for evaluation of medication refill and a finger injury.     Was seen 2 days ago for dysuria, vaginal discharge, vaginal itching, and suprapubic abdominal pain. Symptoms started Tuesday. Denies fevers, chills, nausea, vomiting, chest pain, shortness of breath, flank pain, back pain, vaginal bleeding. LMP 1 week ago. Chlamydia came back positive yesterday and she \"called the hospital and no one answered\" so she hasn't started antibiotics yet. The urine was negative for UTI or blood. She declined pelvic at that time so they had her self swab for GC/CL. Also asked me if \"putting food in her vagina can cause chlamydia\"     Cut the middle of her right pinky 2 weeks ago with a knife between the DIP and PIP joints.  She reports that she could see \"white stuff\" underneath.  She did not be evaluated at that time.  No x-rays were done or any laceration repair.  Reports that since that injury she has not been able " "to bend the tip of her finger.  She can bend at the PIP joint but the DIP joint cannot actively bend.  I am able to passively bend it without pain or difficulty.    Denies SI or HI here.    Medical History     Past Medical History:   Diagnosis Date    Asthma        No past surgical history on file.    No family history on file.    Social History     Tobacco Use    Smoking status: Every Day     Types: Vaping Device    Smokeless tobacco: Never   Substance Use Topics    Alcohol use: Not Currently     Alcohol/week: 19.0 standard drinks of alcohol    Drug use: Not Currently     Types: Marijuana       doxycycline hyclate (VIBRAMYCIN) 100 MG capsule  chlorhexidine (PERIDEX) 0.12 % solution        Physical Exam     First Vitals:  Patient Vitals for the past 24 hrs:   BP Temp Temp src Pulse Resp SpO2 Height Weight   06/22/24 1434 110/68 -- -- 74 -- 100 % -- --   06/22/24 1200 114/57 99  F (37.2  C) Oral 93 18 97 % 1.753 m (5' 9\") 72.6 kg (160 lb)       PHYSICAL EXAM:   Physical Exam  Exam conducted with a chaperone present.   Constitutional:       Appearance: She is well-developed.   Abdominal:      General: Abdomen is flat.      Palpations: Abdomen is soft.      Tenderness: There is abdominal tenderness in the suprapubic area. There is no right CVA tenderness, left CVA tenderness, guarding or rebound.   Genitourinary:     Vagina: No foreign body. Vaginal discharge present. No tenderness.      Cervix: Normal and dilated. No cervical motion tenderness, discharge or friability.      Comments: Dr. Diamond chaperone  Neurological:      Mental Status: She is alert.           Results     LAB:  All pertinent labs reviewed and interpreted  Labs Ordered and Resulted from Time of ED Arrival to Time of ED Departure   ROUTINE UA WITH MICROSCOPIC REFLEX TO CULTURE - Abnormal       Result Value    Color Urine Light Yellow      Appearance Urine Clear      Glucose Urine Negative      Bilirubin Urine Negative      Ketones Urine Negative   "    Specific Gravity Urine 1.009      Blood Urine Negative      pH Urine 7.0      Protein Albumin Urine Negative      Urobilinogen Urine <2.0      Nitrite Urine Negative      Leukocyte Esterase Urine Negative      Bacteria Urine Few (*)     Mucus Urine Present (*)     RBC Urine 1      WBC Urine 2      Squamous Epithelials Urine 2 (*)    WET PREPARATION - Abnormal    Trichomonas Absent      Yeast Absent      Clue Cells Absent      WBCs/high power field 1+ (*)    HCG QUALITATIVE URINE - Normal    hCG Urine Qualitative Negative     HIV ANTIGEN ANTIBODY COMBO   TREPONEMA ABS W REFLEX TO RPR AND TITER       RADIOLOGY:  No orders to display         Juliann Alberto PA-C  Emergency Medicine   [facility] EMERGENCY ROOM       Juliann Alberto PA-C  06/22/24 7619

## 2024-06-22 NOTE — DISCHARGE INSTRUCTIONS
Your urine looks improved compared to 2 days ago. Your pregnancy test was negative. Your pelvic exam here was reassuring although did show a lot of white discharge in the vagina. Your swabs here showed no yeast or other bacterial infection. You tested positive for chlamydia when they tested you at the previous hospital so we gave you your first dose of the antibiotic here and I will send it to the pharmacy. You will take it for 7 days. I recommend anyone that you

## 2024-06-23 LAB — T PALLIDUM AB SER QL: NONREACTIVE

## 2024-07-14 LAB
ALBUMIN UR-MCNC: NEGATIVE MG/DL
APPEARANCE UR: CLEAR
BILIRUB UR QL STRIP: NEGATIVE
COLOR UR AUTO: ABNORMAL
GLUCOSE UR STRIP-MCNC: NEGATIVE MG/DL
HCG UR QL: NEGATIVE
HGB UR QL STRIP: NEGATIVE
KETONES UR STRIP-MCNC: NEGATIVE MG/DL
LEUKOCYTE ESTERASE UR QL STRIP: ABNORMAL
MUCOUS THREADS #/AREA URNS LPF: PRESENT /LPF
NITRATE UR QL: NEGATIVE
PH UR STRIP: 5.5 [PH] (ref 5–7)
RBC URINE: 0 /HPF
SP GR UR STRIP: 1.01 (ref 1–1.03)
SQUAMOUS EPITHELIAL: 4 /HPF
UROBILINOGEN UR STRIP-MCNC: <2 MG/DL
WBC URINE: 2 /HPF

## 2024-07-14 PROCEDURE — 81025 URINE PREGNANCY TEST: CPT | Performed by: EMERGENCY MEDICINE

## 2024-07-14 PROCEDURE — 81001 URINALYSIS AUTO W/SCOPE: CPT | Performed by: EMERGENCY MEDICINE

## 2024-07-14 PROCEDURE — 99283 EMERGENCY DEPT VISIT LOW MDM: CPT

## 2024-07-14 PROCEDURE — 87086 URINE CULTURE/COLONY COUNT: CPT | Performed by: EMERGENCY MEDICINE

## 2024-07-14 ASSESSMENT — COLUMBIA-SUICIDE SEVERITY RATING SCALE - C-SSRS
6. HAVE YOU EVER DONE ANYTHING, STARTED TO DO ANYTHING, OR PREPARED TO DO ANYTHING TO END YOUR LIFE?: NO
1. IN THE PAST MONTH, HAVE YOU WISHED YOU WERE DEAD OR WISHED YOU COULD GO TO SLEEP AND NOT WAKE UP?: NO
2. HAVE YOU ACTUALLY HAD ANY THOUGHTS OF KILLING YOURSELF IN THE PAST MONTH?: NO

## 2024-07-15 ENCOUNTER — HOSPITAL ENCOUNTER (EMERGENCY)
Facility: CLINIC | Age: 24
Discharge: HOME | End: 2024-07-15
Payer: COMMERCIAL

## 2024-07-15 VITALS
HEART RATE: 79 BPM | SYSTOLIC BLOOD PRESSURE: 120 MMHG | DIASTOLIC BLOOD PRESSURE: 84 MMHG | OXYGEN SATURATION: 100 % | TEMPERATURE: 98.3 F | WEIGHT: 165 LBS | BODY MASS INDEX: 24.44 KG/M2 | RESPIRATION RATE: 18 BRPM | HEIGHT: 69 IN

## 2024-07-15 DIAGNOSIS — B37.9 YEAST INFECTION: ICD-10-CM

## 2024-07-15 LAB
CLUE CELLS: ABNORMAL
TRICHOMONAS, WET PREP: ABNORMAL
WBC'S/HIGH POWER FIELD, WET PREP: ABNORMAL
YEAST, WET PREP: PRESENT

## 2024-07-15 PROCEDURE — 87210 SMEAR WET MOUNT SALINE/INK: CPT

## 2024-07-15 PROCEDURE — 250N000013 HC RX MED GY IP 250 OP 250 PS 637

## 2024-07-15 RX ORDER — FLUCONAZOLE 150 MG/1
150 TABLET ORAL ONCE
Status: COMPLETED | OUTPATIENT
Start: 2024-07-15 | End: 2024-07-15

## 2024-07-15 RX ORDER — FLUCONAZOLE 150 MG/1
150 TABLET ORAL ONCE
Qty: 1 TABLET | Refills: 0 | Status: SHIPPED | OUTPATIENT
Start: 2024-07-15 | End: 2024-07-15

## 2024-07-15 RX ADMIN — FLUCONAZOLE 150 MG: 150 TABLET ORAL at 01:02

## 2024-07-15 ASSESSMENT — ACTIVITIES OF DAILY LIVING (ADL): ADLS_ACUITY_SCORE: 33

## 2024-07-15 NOTE — ED TRIAGE NOTES
Pt reports couple days ago bathing with a rosemary mint wash, pt reports she thinks she had a yeast infection.  Has pelvic burning and itching.  Pt reports no sexual activity since the shower.  Pt was treated for chlamydia recently and did finish that treatment.      Triage Assessment (Adult)       Row Name 07/14/24 0069          Triage Assessment    Airway WDL WDL        Respiratory WDL    Respiratory WDL WDL        Skin Circulation/Temperature WDL    Skin Circulation/Temperature WDL WDL        Cardiac WDL    Cardiac WDL WDL        Peripheral/Neurovascular WDL    Peripheral Neurovascular WDL WDL        Cognitive/Neuro/Behavioral WDL    Cognitive/Neuro/Behavioral WDL WDL

## 2024-07-15 NOTE — DISCHARGE INSTRUCTIONS
You were seen in the emergency department today for your symptoms.    You were given a dose of medication here to treat a yeast infection. If you still have symptoms in 72 hours, take the dose of the medication that you were prescribed.     Please follow up with your primary care clinician within the next week for recheck.  Please return to the emergency department if you have any new, worsening, or concerning symptoms.    Additional information about yeast infections from the [facility] website:    A vaginal yeast infection is a fungal infection that causes irritation, discharge and intense itchiness of the vagina and the vulva -- the tissues at the vaginal opening.    Also called vaginal candidiasis, vaginal yeast infection affects up to 3 out of 4 women at some point in their lifetimes. Many women experience at least two episodes.    A vaginal yeast infection isn't considered a sexually transmitted infection. But, there's an increased risk of vaginal yeast infection at the time of first regular sexual activity. There's also some evidence that infections may be linked to mouth to genital contact (oral-genital sex).    Medications can effectively treat vaginal yeast infections. If you have recurrent yeast infections -- four or more within a year -- you may need a longer treatment course and a maintenance plan.    [email or web address]

## 2024-07-15 NOTE — ED PROVIDER NOTES
EMERGENCY DEPARTMENT ENCOUNTER      NAME: Rome Jensen  AGE: 24 year old female  YOB: 2000  MRN: [id number]  EVALUATION DATE & TIME: 07/14/24 11:17 PM    PCP: Avila Villagran    ED PROVIDER: Rachna Cardoza PA-C      CHIEF COMPLAINT:  Vaginal Itching      FINAL IMPRESSION:  1. Yeast infection          ED COURSE & MEDICAL DECISION MAKING:  Pertinent Labs & Imaging studies reviewed. (See chart for details)    The patient is an otherwise healthy 24 year old-year-old female presenting to the emergency department for evaluation of vaginal itching and white clumpy discharge after using a strongly scented Rosemary hair mask a few days ago.  No fevers, urinary symptoms.  Does not wear some abdominal cramping.  Recently finished course of antibiotics for chlamydia.  No other associated symptoms endorsed at this time.    Initial vital signs reviewed and all within normal limits.  Patient is afebrile.  On exam, she is clinically well-appearing and nontoxic-appearing in no acute distress.  Abdomen is soft, nondistended, nontender with no rebound or guarding.  Bowel sounds are present.  No CVA tenderness.  Discussed obtaining a pelvic exam or even external pelvic examination, patient feels confident that there are no lesions to the area and declines pelvic examination today which I do feel is reasonable.    Pregnancy testing negative.  Urinalysis not overtly suggestive of infection, will send for culture and defer treatment until culture results are available.  Wet prep consistent with yeast infection.  Patient started on fluconazole with dose given in emergency department.    Discussed plan for discharge home with close outpatient follow-up with primary care clinician. The patient verbalized understanding and is comfortable with this plan. Symptomatic home cares discussed. Emphasized importance of follow up with primary care clinician. Strict return precautions discussed.     At the conclusion of  the encounter I discussed the results of all of the tests and the disposition. The questions were answered. The patient or family acknowledged understanding and was agreeable with the care plan.       ED COURSE:  12:04 AM I met and introduced myself to the patient. I gathered initial history and performed an initial physical exam. We discussed options and plan for diagnostics and treatment here in the ED.  1:00 AM I discussed the plan for discharge with the patient, and patient is agreeable. We discussed supportive cares at home and reasons for return to the ER including new or worsening symptoms - all questions and concerns addressed to the best of my ability. Strict return precautions discussed. Patient to be discharged by RN.      Medical Decision Making  Obtained supplemental history:Supplemental history obtained?: No  Reviewed external records: External records reviewed?: Documented in chart  Care impacted by chronic illness:N/A  Care significantly affected by social determinants of health:Access to Medical Care  Did you consider but not order tests?: Work up considered but not performed and documented in chart, if applicable  Did you interpret images independently?: Independent interpretation of ECG and images noted in documentation, when applicable.  Consultation discussion with other provider:Did you involve another provider (consultant, MH, pharmacy, etc.)?: I discussed the care with another health care provider, see documentation for details.  Discharge. I prescribed additional prescription strength medication(s) as charted. See documentation for any additional details.            MEDICATIONS GIVEN IN THE EMERGENCY:  Medications   fluconazole (DIFLUCAN) tablet 150 mg (150 mg Oral $Given 7/15/24 0102)       NEW PRESCRIPTIONS STARTED AT TODAY'S ER VISIT  Discharge Medication List as of 7/15/2024  1:03 AM        START taking these medications    Details   fluconazole (DIFLUCAN) 150 MG tablet Take 1 tablet  (150 mg) by mouth once for 1 dose, Disp-1 tablet, R-0, Local Print                =================================================================    HPI    Patient information was obtained from: Patient    Use of Intrepreter: N/A      Rome Jensen is a 24 year old female with pertinent medical history of mood disorder who presents to the emergency department for evaluation of vaginal discharge.    Patient reports abnormal vaginal discharge after taking a bath with a peppermint rosemary wash a couple days ago. She describes the discharge as stringy white and clumpy. She also reports that her vagina feels itchy and burns. Endorses abdominal cramping. She recently finished a course of antibiotics for chlamydia. Patient reports a possible chance of pregnancy. She last had sexual intercourse 2 weeks ago. She took a home pregnancy test 2 days ago (7/12/24) that was negative. She denies any concerns for STDs.  No fevers.  No urinary symptoms.  No any other associated symptoms endorsed at this time.      PAST MEDICAL HISTORY:  Past Medical History:   Diagnosis Date    Asthma        PAST SURGICAL HISTORY:  History reviewed. No pertinent surgical history.    CURRENT MEDICATIONS:    Prior to Admission Medications   Prescriptions Last Dose Informant Patient Reported? Taking?   chlorhexidine (PERIDEX) 0.12 % solution   No No   Sig: Swish and spit 15 mLs in mouth 3 times daily      Facility-Administered Medications: None       ALLERGIES:  Allergies   Allergen Reactions    Amoxicillin Hives       FAMILY HISTORY:  No family history on file.    SOCIAL HISTORY:  Social History     Tobacco Use    Smoking status: Every Day     Types: Vaping Device    Smokeless tobacco: Never   Substance Use Topics    Alcohol use: Not Currently     Alcohol/week: 19.0 standard drinks of alcohol    Drug use: Not Currently     Types: Marijuana        VITALS:    First Vitals:  No data found.      No data found.        PHYSICAL EXAM  VITAL SIGNS: /84  "  Pulse 79   Temp 98.3  F (36.8  C) (Oral)   Resp 18   Ht 1.753 m (5' 9\")   Wt 74.8 kg (165 lb)   LMP 06/04/2024 (Approximate)   SpO2 100%   BMI 24.37 kg/m     GENERAL: Awake, alert, answering questions appropriately, in no acute distress.  HEENT: Moist mucous membranes. Posterior oropharynx clear with no erythema, no exudate. No tonsillar hypertrophy. Uvula midline. Tolerating secretions, no drooling.    SPEECH:  Easy to understand speech, Normal volume and akshat. Normal phonation.  PULMONARY: No respiratory distress, Breathing comfortably on room air. Lungs clear to auscultation bilaterally.  CARDIOVASCULAR: Regular rate and rhythm, radial pulses present, symmetric, and normal.  ABDOMINAL: Soft, Nondistended, Nontender, No rebound or guarding. Bowel sounds present.  BACK: No CVA tenderness  EXTREMITIES: Extremities are warm and well perfused. No lower extremity edema.  NEUROLOGIC: Moving all extremities spontaneously.   SKIN: Exposed areas of skin warm, dry, no rashes.  PSYCH: Normal mood and affect.         RADIOLOGY/LAB:  Reviewed all pertinent imaging. Please see official radiology report.  All pertinent labs reviewed and interpreted.  Results for orders placed or performed during the hospital encounter of 07/15/24   UA with Microscopic reflex to Culture    Specimen: Urine, Clean Catch   Result Value Ref Range    Color Urine Light Yellow Colorless, Straw, Light Yellow, Yellow    Appearance Urine Clear Clear    Glucose Urine Negative Negative mg/dL    Bilirubin Urine Negative Negative    Ketones Urine Negative Negative mg/dL    Specific Gravity Urine 1.008 1.001 - 1.030    Blood Urine Negative Negative    pH Urine 5.5 5.0 - 7.0    Protein Albumin Urine Negative Negative mg/dL    Urobilinogen Urine <2.0 <2.0 mg/dL    Nitrite Urine Negative Negative    Leukocyte Esterase Urine 250 Al/uL (A) Negative    Mucus Urine Present (A) None Seen /LPF    RBC Urine 0 <=2 /HPF    WBC Urine 2 <=5 /HPF    Squamous " Epithelials Urine 4 (H) <=1 /HPF   HCG qualitative urine   Result Value Ref Range    hCG Urine Qualitative Negative Negative   Urine Culture    Specimen: Urine, Clean Catch   Result Value Ref Range    Culture <10,000 CFU/mL Mixture of Urogenital Nuzhat    Wet prep    Specimen: Vagina; Swab   Result Value Ref Range    Trichomonas Absent Absent    Yeast Present (A) Absent    Clue Cells Absent Absent    WBCs/high power field 3+ (A) None               I, Elyse Lai, am serving as a scribe to document services personally performed by Rachna Cardoza PA-C, based on my observation and the provider's statements to me. I, Rachna Cardoza PA-C attest that Elyse Lai is acting in a scribe capacity, has observed my performance of the services and has documented them in accordance with my direction.         Rachna Cardoza PA-C  Emergency Medicine  [facility] EMERGENCY ROOM  Critical access hospital[address] [facility] [id number] [phone]  Dept: [phone]       Rachna Cardoza PA-C  08/10/24 0258

## 2024-07-16 LAB — BACTERIA UR CULT: NORMAL

## 2025-03-19 ENCOUNTER — HOSPITAL ENCOUNTER (EMERGENCY)
Facility: CLINIC | Age: 25
Discharge: HOME | End: 2025-03-19
Attending: EMERGENCY MEDICINE | Admitting: EMERGENCY MEDICINE
Payer: COMMERCIAL

## 2025-03-19 VITALS
RESPIRATION RATE: 18 BRPM | HEART RATE: 97 BPM | SYSTOLIC BLOOD PRESSURE: 134 MMHG | TEMPERATURE: 98.1 F | BODY MASS INDEX: 27.4 KG/M2 | WEIGHT: 185 LBS | DIASTOLIC BLOOD PRESSURE: 80 MMHG | HEIGHT: 69 IN | OXYGEN SATURATION: 98 %

## 2025-03-19 DIAGNOSIS — M54.50 LUMBAR BACK PAIN: ICD-10-CM

## 2025-03-19 PROBLEM — F31.11 BIPOLAR I DISORDER, MOST RECENT EPISODE (OR CURRENT) MANIC, MILD (H): Status: ACTIVE | Noted: 2023-12-03

## 2025-03-19 PROBLEM — F31.2 BIPOLAR I DISORDER, SEVERE, CURRENT OR MOST RECENT EPISODE MANIC, WITH PSYCHOTIC FEATURES (H): Status: ACTIVE | Noted: 2020-12-22

## 2025-03-19 PROBLEM — F12.20 CANNABIS USE DISORDER, MODERATE, DEPENDENCE (H): Status: ACTIVE | Noted: 2020-12-22

## 2025-03-19 PROBLEM — F10.21 ALCOHOL USE DISORDER, MODERATE, IN EARLY REMISSION (H): Status: ACTIVE | Noted: 2023-12-03

## 2025-03-19 LAB
ALBUMIN UR-MCNC: NEGATIVE MG/DL
APPEARANCE UR: CLEAR
BILIRUB UR QL STRIP: NEGATIVE
COLOR UR AUTO: COLORLESS
GLUCOSE UR STRIP-MCNC: NEGATIVE MG/DL
HCG UR QL: NEGATIVE
HGB UR QL STRIP: NEGATIVE
KETONES UR STRIP-MCNC: NEGATIVE MG/DL
LEUKOCYTE ESTERASE UR QL STRIP: NEGATIVE
NITRATE UR QL: NEGATIVE
PH UR STRIP: 6.5 [PH] (ref 5–7)
RBC URINE: 0 /HPF
SP GR UR STRIP: 1 (ref 1–1.03)
SQUAMOUS EPITHELIAL: <1 /HPF
UROBILINOGEN UR STRIP-MCNC: <2 MG/DL
WBC URINE: <1 /HPF

## 2025-03-19 PROCEDURE — 250N000011 HC RX IP 250 OP 636: Performed by: EMERGENCY MEDICINE

## 2025-03-19 PROCEDURE — 250N000013 HC RX MED GY IP 250 OP 250 PS 637: Performed by: EMERGENCY MEDICINE

## 2025-03-19 PROCEDURE — 81025 URINE PREGNANCY TEST: CPT | Performed by: EMERGENCY MEDICINE

## 2025-03-19 PROCEDURE — 99284 EMERGENCY DEPT VISIT MOD MDM: CPT | Mod: 25 | Performed by: EMERGENCY MEDICINE

## 2025-03-19 PROCEDURE — 81003 URINALYSIS AUTO W/O SCOPE: CPT | Performed by: EMERGENCY MEDICINE

## 2025-03-19 PROCEDURE — 96372 THER/PROPH/DIAG INJ SC/IM: CPT | Performed by: EMERGENCY MEDICINE

## 2025-03-19 RX ORDER — KETOROLAC TROMETHAMINE 30 MG/ML
INJECTION, SOLUTION INTRAMUSCULAR; INTRAVENOUS
Status: DISPENSED
Start: 2025-03-19 | End: 2025-03-19

## 2025-03-19 RX ORDER — KETOROLAC TROMETHAMINE 30 MG/ML
30 INJECTION, SOLUTION INTRAMUSCULAR; INTRAVENOUS ONCE
Status: COMPLETED | OUTPATIENT
Start: 2025-03-19 | End: 2025-03-19

## 2025-03-19 RX ORDER — LIDOCAINE 4 G/G
PATCH TOPICAL
Status: DISPENSED
Start: 2025-03-19 | End: 2025-03-19

## 2025-03-19 RX ORDER — LIDOCAINE 4 G/G
1 PATCH TOPICAL ONCE
Status: DISCONTINUED | OUTPATIENT
Start: 2025-03-19 | End: 2025-03-19 | Stop reason: HOSPADM

## 2025-03-19 RX ADMIN — LIDOCAINE 1 PATCH: 4 PATCH TOPICAL at 07:52

## 2025-03-19 RX ADMIN — KETOROLAC TROMETHAMINE 30 MG: 30 INJECTION, SOLUTION INTRAMUSCULAR at 07:50

## 2025-03-19 ASSESSMENT — ACTIVITIES OF DAILY LIVING (ADL): ADLS_ACUITY_SCORE: 41

## 2025-03-19 NOTE — DISCHARGE INSTRUCTIONS
As needed for pain control at home, take:  - over-the-counter ibuprofen 800mg by mouth every 8 hours (max dose 2400mg in 24 hours)  - over-the-counter acetaminophen 1g by mouth every 6 hours (max dose 4g in 24 hours)  - prescribed tizanidine for muscle spasm  - over-the-counter lidocaine patches to painful area (up to 12 hours on, then 12 hours off)  - over-the-counter Tiger Balm patches to painful area (1-2 per day)  - ice pack to painful area up to 20 minutes every 1 hour   - back exercises 30 minutes daily    The worst thing you can do is lie still in bed and not move. This will result in worsened tightening of the low back muscles and worse pain. You need to move as much as possible to help keep the muscles loose.    I made a referral to [facility] for you; they will be calling you in the next couple days. You can also call them sooner to arrange follow up.    Follow up with your Primary Care provider in 2 days for a recheck.    Return to the Emergency Department for any inability to urinate, inability to move your legs, or any other concerns.

## 2025-03-19 NOTE — ED NOTES
RN discussed discharge with pt - pt will follow up with PMD as needed. Pt verbalized understanding of Rx and will take as directed.
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
## 2025-03-19 NOTE — ED TRIAGE NOTES
Pt reports lower back pain that she states it pain over the spine. PT states the pain started a few days ago. Denies urinary sx. Denies injuries.      Triage Assessment (Adult)       Row Name 03/19/25 0643          Triage Assessment    Airway WDL WDL        Respiratory WDL    Respiratory WDL WDL        Skin Circulation/Temperature WDL    Skin Circulation/Temperature WDL WDL        Cardiac WDL    Cardiac WDL WDL        Peripheral/Neurovascular WDL    Peripheral Neurovascular WDL WDL        Cognitive/Neuro/Behavioral WDL    Cognitive/Neuro/Behavioral WDL WDL

## 2025-04-14 ENCOUNTER — HOSPITAL ENCOUNTER (EMERGENCY)
Facility: CLINIC | Age: 25
Discharge: HOME | End: 2025-04-15
Attending: EMERGENCY MEDICINE | Admitting: EMERGENCY MEDICINE
Payer: COMMERCIAL

## 2025-04-14 VITALS
OXYGEN SATURATION: 99 % | RESPIRATION RATE: 16 BRPM | TEMPERATURE: 97.7 F | HEART RATE: 87 BPM | HEIGHT: 69 IN | WEIGHT: 180 LBS | SYSTOLIC BLOOD PRESSURE: 132 MMHG | BODY MASS INDEX: 26.66 KG/M2 | DIASTOLIC BLOOD PRESSURE: 68 MMHG

## 2025-04-14 DIAGNOSIS — Z91.89 AT RISK FOR SEXUALLY TRANSMITTED DISEASE DUE TO UNPROTECTED SEX: ICD-10-CM

## 2025-04-14 DIAGNOSIS — N89.8 VAGINAL DISCHARGE: ICD-10-CM

## 2025-04-14 LAB
ALBUMIN UR-MCNC: NEGATIVE MG/DL
APPEARANCE UR: CLEAR
BILIRUB UR QL STRIP: NEGATIVE
CLUE CELLS: ABNORMAL
COLOR UR AUTO: ABNORMAL
GLUCOSE UR STRIP-MCNC: NEGATIVE MG/DL
HCG UR QL: NEGATIVE
HGB UR QL STRIP: NEGATIVE
KETONES UR STRIP-MCNC: NEGATIVE MG/DL
LEUKOCYTE ESTERASE UR QL STRIP: NEGATIVE
MUCOUS THREADS #/AREA URNS LPF: PRESENT /LPF
NITRATE UR QL: NEGATIVE
PH UR STRIP: 5 [PH] (ref 5–7)
RBC URINE: <1 /HPF
SP GR UR STRIP: 1.02 (ref 1–1.03)
SQUAMOUS EPITHELIAL: <1 /HPF
TRICHOMONAS, WET PREP: ABNORMAL
UROBILINOGEN UR STRIP-MCNC: NORMAL MG/DL
WBC URINE: 1 /HPF
WBC'S/HIGH POWER FIELD, WET PREP: ABNORMAL
YEAST, WET PREP: ABNORMAL

## 2025-04-14 PROCEDURE — 87210 SMEAR WET MOUNT SALINE/INK: CPT | Performed by: EMERGENCY MEDICINE

## 2025-04-14 PROCEDURE — 87491 CHLMYD TRACH DNA AMP PROBE: CPT | Performed by: EMERGENCY MEDICINE

## 2025-04-14 PROCEDURE — 99284 EMERGENCY DEPT VISIT MOD MDM: CPT

## 2025-04-14 PROCEDURE — 81025 URINE PREGNANCY TEST: CPT | Performed by: EMERGENCY MEDICINE

## 2025-04-14 PROCEDURE — 81001 URINALYSIS AUTO W/SCOPE: CPT | Performed by: EMERGENCY MEDICINE

## 2025-04-14 RX ORDER — DOXYCYCLINE 100 MG/1
100 CAPSULE ORAL ONCE
Status: COMPLETED | OUTPATIENT
Start: 2025-04-14 | End: 2025-04-15

## 2025-04-14 RX ORDER — DOXYCYCLINE HYCLATE 100 MG
100 TABLET ORAL 2 TIMES DAILY
Qty: 14 TABLET | Refills: 0 | Status: SHIPPED | OUTPATIENT
Start: 2025-04-14 | End: 2025-04-21

## 2025-04-14 RX ORDER — LEVONORGESTREL 1.5 MG/1
1.5 TABLET ORAL ONCE
Status: COMPLETED | OUTPATIENT
Start: 2025-04-14 | End: 2025-04-15

## 2025-04-14 RX ORDER — DOXYCYCLINE 100 MG/1
100 CAPSULE ORAL ONCE
Status: DISCONTINUED | OUTPATIENT
Start: 2025-04-14 | End: 2025-04-14

## 2025-04-14 ASSESSMENT — COLUMBIA-SUICIDE SEVERITY RATING SCALE - C-SSRS
2. HAVE YOU ACTUALLY HAD ANY THOUGHTS OF KILLING YOURSELF IN THE PAST MONTH?: NO
6. HAVE YOU EVER DONE ANYTHING, STARTED TO DO ANYTHING, OR PREPARED TO DO ANYTHING TO END YOUR LIFE?: NO
1. IN THE PAST MONTH, HAVE YOU WISHED YOU WERE DEAD OR WISHED YOU COULD GO TO SLEEP AND NOT WAKE UP?: NO

## 2025-04-14 ASSESSMENT — ACTIVITIES OF DAILY LIVING (ADL): ADLS_ACUITY_SCORE: 41

## 2025-04-15 LAB
C TRACH DNA SPEC QL PROBE+SIG AMP: NEGATIVE
N GONORRHOEA DNA SPEC QL NAA+PROBE: NEGATIVE
SPECIMEN TYPE: NORMAL

## 2025-04-15 PROCEDURE — 250N000013 HC RX MED GY IP 250 OP 250 PS 637: Performed by: EMERGENCY MEDICINE

## 2025-04-15 PROCEDURE — 250N000011 HC RX IP 250 OP 636: Mod: JZ | Performed by: EMERGENCY MEDICINE

## 2025-04-15 PROCEDURE — 96372 THER/PROPH/DIAG INJ SC/IM: CPT | Performed by: EMERGENCY MEDICINE

## 2025-04-15 PROCEDURE — 250N000009 HC RX 250: Performed by: EMERGENCY MEDICINE

## 2025-04-15 RX ADMIN — DOXYCYCLINE HYCLATE 100 MG: 100 CAPSULE ORAL at 00:12

## 2025-04-15 RX ADMIN — LEVONORGESTREL 1.5 MG: 1.5 TABLET ORAL at 00:12

## 2025-04-15 RX ADMIN — LIDOCAINE HYDROCHLORIDE 500 MG: 10 INJECTION, SOLUTION EPIDURAL; INFILTRATION; INTRACAUDAL; PERINEURAL at 00:10

## 2025-04-15 NOTE — ED TRIAGE NOTES
Pt reports new abnormal vaginal discharge starting today. Denies pain or irritation or any urinary complaints.     Triage Assessment (Adult)       Row Name 04/14/25 2014          Triage Assessment    Airway WDL WDL        Respiratory WDL    Respiratory WDL WDL        Skin Circulation/Temperature WDL    Skin Circulation/Temperature WDL WDL        Cardiac WDL    Cardiac WDL WDL        Peripheral/Neurovascular WDL    Peripheral Neurovascular WDL WDL        Cognitive/Neuro/Behavioral WDL    Cognitive/Neuro/Behavioral WDL WDL

## 2025-04-15 NOTE — DISCHARGE INSTRUCTIONS
You should follow-up with your primary care doctor or [facility] tomorrow for further testing for unprotected sexual contact, including HIV, syphilis, hepatitis and others  The gonorrhea chlamydia test will be back within the next 24 to 48 hours  Tylenol 650 mg every 4 hours as needed for pain  Work through your clinic doctors for further concerns

## 2025-04-15 NOTE — ED PROVIDER NOTES
EMERGENCY DEPARTMENT ENCOUnter      NAME: Rome Jensen  AGE: 25 year old female  YOB: 2000  MRN: [id number]  EVALUATION DATE & TIME: 4/14/2025 10:36 PM    PCP: Avila Villagran    ED PROVIDER: Alondra De Jesus MD      Chief Complaint   Patient presents with    Vaginal Discharge         FINAL IMPRESSION:  1. At risk for sexually transmitted disease due to unprotected sex    2. Vaginal discharge          ED COURSE & MEDICAL DECISION MAKING:      In summary, the patient is a 25-year-old female that presents to the emergency department for evaluation of concerns about unprotected sex about 48 hours ago.  We we will prescribe Plan B and prophylaxis against gonorrhea and chlamydia.  I recommended that she follow-up with [facility] or primary care tomorrow for further testing like HIV, syphilis or hepatitis.    2300- I met with the patient, obtained history, performed an initial exam, and discussed options and plan for diagnostics and treatment here in the ED. ceftriaxone 500 mg IM was administered.  Doxycycline 100 mg p.o. was administered.  Plan B 1.5 mg p.o. was administered.  2320-We discussed the plan for discharge and recommended follow-up, and the patient is agreeable. Reviewed supportive cares, symptomatic treatment, outpatient follow up, and reasons to return to the Emergency Department. Patient to be discharged by ED RN.      Medical Decision Making  Medical Decision Making  I considered additional work up, including further lab testing, but deferred to outpateint followup  Discharge. I prescribed additional prescription strength medication(s) as charted. See documentation for any additional details.    MIPS (CTPE, Dental pain, Ceja, Sinusitis, Asthma/COPD, Head Trauma): Not Applicable    SEPSIS: None        At the conclusion of the encounter I discussed the results of all of the tests and the disposition. The questions were answered. The patient or family acknowledged  understanding and was agreeable with the care plan.     MEDICATIONS GIVEN IN THE EMERGENCY:  Medications   levonorgestrel (PLAN B) tablet 1.5 mg (has no administration in time range)   doxycycline hyclate (VIBRAMYCIN) capsule 100 mg (has no administration in time range)   cefTRIAXone (ROCEPHIN) 500 mg in lidocaine injection (has no administration in time range)   doxycycline hyclate (VIBRAMYCIN) capsule 100 mg (has no administration in time range)       NEW PRESCRIPTIONS STARTED AT TODAY'S ER VISIT  New Prescriptions    DOXYCYCLINE HYCLATE (VIBRA-TABS) 100 MG TABLET    Take 1 tablet (100 mg) by mouth 2 times daily for 7 days.          =================================================================    HPI        Rome Jensen is a 25 year old female with a pertinent history of Bipolar I who presents to this ED via walk in for evaluation of vaginal discharge.    The patient reports here with vaginal discharge that started today along with lower abdominal cramping.  She notes the discharge is white and thick but is odorless.  She reports the cramping feels like period cramps.  She does note having unprotected sex two days ago.  She notes 3/24 was her last menstrual period.  She denies any bowel or bladder changes.  No other complaints expressed.  She takes her Bipolar medication but denies any other chronic medication.      Social: Denies smoking cigarettes or alcohol use.  She does note vaping.    REVIEW OF SYSTEMS   Constitutional:  Denies fever or chills  HENT:  Denies sore throat   Respiratory:  Denies cough or shortness of breath   Cardiovascular:  Denies chest pain or palpitations  GI:  Denies nausea, or vomiting. Positive for lower abdominal cramping.  Gu: Positive for vaginal discharge that is described as thick and odorless.  Musculoskeletal:  Denies any new extremity pain   Skin:  Denies rash   Neurologic:  Denies headache, focal weakness or sensory changes    All other systems reviewed and are  negative      PAST MEDICAL HISTORY:  Past Medical History:   Diagnosis Date    Asthma        PAST SURGICAL HISTORY:  No past surgical history on file.    CURRENT MEDICATIONS:    doxycycline hyclate (VIBRA-TABS) 100 MG tablet  chlorhexidine (PERIDEX) 0.12 % solution  tiZANidine (ZANAFLEX) 4 MG tablet        ALLERGIES:  Allergies   Allergen Reactions    Amoxicillin Hives       FAMILY HISTORY:  No family history on file.    SOCIAL HISTORY:   Social History     Socioeconomic History    Marital status: Single   Tobacco Use    Smoking status: Every Day     Types: Vaping Device    Smokeless tobacco: Never   Substance and Sexual Activity    Alcohol use: Not Currently     Alcohol/week: 19.0 standard drinks of alcohol    Drug use: Not Currently     Types: Marijuana    Sexual activity: Yes     Partners: Male     Birth control/protection: None     Social Drivers of Health     Financial Resource Strain: Medium Risk (1/8/2024)    [facility]    Financial Resource Strain     Difficulty of Paying Living Expenses: 3   Food Insecurity: Food Insecurity Present (1/8/2024)    [facility]    Food Insecurity     Do you worry your food will run out before you are able to buy more?: 2   Transportation Needs: Unmet Transportation Needs (1/8/2024)    Received from [facility]    Transportation Needs     Does lack of transportation keep you from medical appointments?: 2     Does lack of transportation keep you from work, meetings or getting things that you need?: 2   Social Connections: Socially Isolated (1/8/2024)    Received from [facility]    Social Connections     Do you often feel lonely or isolated from those around you?: 4   Housing Stability: Medium Risk (1/8/2024)    Received from [facility]    Housing Stability     What is your housing  "situation today?: 2       VITALS:  Patient Vitals for the past 24 hrs:   BP Temp Temp src Pulse Resp SpO2 Height Weight   04/14/25 2013 132/68 97.7  F (36.5  C) Oral 87 16 99 % 1.753 m (5' 9\") 81.6 kg (180 lb)       PHYSICAL EXAM    Constitutional:  Well developed, Well nourished,  HENT:  Normocephalic, Atraumatic, Bilateral external ears normal, Oropharynx moist, Nose normal.   Neck:  Normal range of motion, No meningismus, No stridor.   Eyes:  EOMI, Conjunctiva normal, No discharge.   Respiratory:  Normal breath sounds, No respiratory distress, No wheezing, No chest tenderness.   Cardiovascular:  Normal heart rate, Normal rhythm, No murmurs  GI:  Soft, No tenderness, No guarding, No CVA tenderness.   Musculoskeletal:  Neurovascularly intact distally, No edema, No tenderness, No cyanosis, Good range of motion in all major joints.   Integument:  Warm, Dry, No erythema, No rash.   Lymphatic:  No lymphadenopathy noted.   Neurologic:  Alert & oriented , Normal motor function,No focal deficits noted.   Psychiatric:  Affect normal, Judgment normal, Mood normal.      LAB:  All pertinent labs reviewed and interpreted.  Results for orders placed or performed during the hospital encounter of 04/14/25   UA with Microscopic reflex to Culture    Specimen: Urine, Clean Catch   Result Value Ref Range    Color Urine Light Yellow Colorless, Straw, Light Yellow, Yellow    Appearance Urine Clear Clear    Glucose Urine Negative Negative mg/dL    Bilirubin Urine Negative Negative    Ketones Urine Negative Negative mg/dL    Specific Gravity Urine 1.020 1.001 - 1.030    Blood Urine Negative Negative    pH Urine 5.0 5.0 - 7.0    Protein Albumin Urine Negative Negative mg/dL    Urobilinogen Urine Normal Normal mg/dL    Nitrite Urine Negative Negative    Leukocyte Esterase Urine Negative Negative    Mucus Urine Present (A) None Seen /LPF    RBC Urine <1 <=2 /HPF    WBC Urine 1 <=5 /HPF    Squamous Epithelials Urine <1 <=1 /HPF   HCG " qualitative urine   Result Value Ref Range    hCG Urine Qualitative Negative Negative   Wet prep    Specimen: Vagina; Swab   Result Value Ref Range    Trichomonas Absent Absent    Yeast Absent Absent    Clue Cells Absent Absent    WBCs/high power field 3+ (A) None           I, Erick Mckinney, am serving as a scribe to document services personally performed by Dr. De Jesus based on my observation and the provider's statements to me. I, Dr. De Jesus attest that Erick Mckinney is acting in a scribe capacity, has observed my performance of the services and has documented them in accordance with my direction.    Alondra De Jesus MD  Emergency Medicine  [facility] [place] EMERGENCY ROOM  [address] [facility] [id number] [phone]  Dept: [phone]      Alondra De Jesus MD  04/14/25 4002

## 2025-04-20 ENCOUNTER — HOSPITAL ENCOUNTER (EMERGENCY)
Facility: CLINIC | Age: 25
Discharge: HOME | End: 2025-04-21
Attending: EMERGENCY MEDICINE | Admitting: EMERGENCY MEDICINE
Payer: COMMERCIAL

## 2025-04-20 DIAGNOSIS — N93.9 VAGINAL BLEEDING: ICD-10-CM

## 2025-04-20 LAB
ABO + RH BLD: NORMAL
ALBUMIN UR-MCNC: NEGATIVE MG/DL
ANION GAP SERPL CALCULATED.3IONS-SCNC: 10 MMOL/L (ref 7–15)
APPEARANCE UR: CLEAR
BASOPHILS # BLD AUTO: 0 10E3/UL (ref 0–0.2)
BASOPHILS NFR BLD AUTO: 1 %
BILIRUB UR QL STRIP: NEGATIVE
BLD GP AB SCN SERPL QL: NEGATIVE
BUN SERPL-MCNC: 9.2 MG/DL (ref 6–20)
CALCIUM SERPL-MCNC: 9.3 MG/DL (ref 8.8–10.4)
CHLORIDE SERPL-SCNC: 103 MMOL/L (ref 98–107)
COLOR UR AUTO: ABNORMAL
CREAT SERPL-MCNC: 0.62 MG/DL (ref 0.51–0.95)
EGFRCR SERPLBLD CKD-EPI 2021: >90 ML/MIN/1.73M2
EOSINOPHIL # BLD AUTO: 0.3 10E3/UL (ref 0–0.7)
EOSINOPHIL NFR BLD AUTO: 3 %
ERYTHROCYTE [DISTWIDTH] IN BLOOD BY AUTOMATED COUNT: 13.7 % (ref 10–15)
GLUCOSE SERPL-MCNC: 87 MG/DL (ref 70–99)
GLUCOSE UR STRIP-MCNC: NEGATIVE MG/DL
HCG INTACT+B SERPL-ACNC: <1 MIU/ML
HCO3 SERPL-SCNC: 26 MMOL/L (ref 22–29)
HCT VFR BLD AUTO: 42.1 % (ref 35–47)
HGB BLD-MCNC: 14.1 G/DL (ref 11.7–15.7)
HGB UR QL STRIP: ABNORMAL
IMM GRANULOCYTES # BLD: 0 10E3/UL
IMM GRANULOCYTES NFR BLD: 0 %
KETONES UR STRIP-MCNC: NEGATIVE MG/DL
LEUKOCYTE ESTERASE UR QL STRIP: NEGATIVE
LYMPHOCYTES # BLD AUTO: 2.4 10E3/UL (ref 0.8–5.3)
LYMPHOCYTES NFR BLD AUTO: 31 %
MCH RBC QN AUTO: 28.7 PG (ref 26.5–33)
MCHC RBC AUTO-ENTMCNC: 33.5 G/DL (ref 31.5–36.5)
MCV RBC AUTO: 86 FL (ref 78–100)
MONOCYTES # BLD AUTO: 0.4 10E3/UL (ref 0–1.3)
MONOCYTES NFR BLD AUTO: 6 %
MUCOUS THREADS #/AREA URNS LPF: PRESENT /LPF
NEUTROPHILS # BLD AUTO: 4.7 10E3/UL (ref 1.6–8.3)
NEUTROPHILS NFR BLD AUTO: 60 %
NITRATE UR QL: NEGATIVE
NRBC # BLD AUTO: 0 10E3/UL
NRBC BLD AUTO-RTO: 0 /100
PH UR STRIP: 5.5 [PH] (ref 5–7)
PLATELET # BLD AUTO: 180 10E3/UL (ref 150–450)
POTASSIUM SERPL-SCNC: 4.2 MMOL/L (ref 3.4–5.3)
RBC # BLD AUTO: 4.91 10E6/UL (ref 3.8–5.2)
RBC URINE: 24 /HPF
SODIUM SERPL-SCNC: 139 MMOL/L (ref 135–145)
SP GR UR STRIP: 1.02 (ref 1–1.03)
SPECIMEN EXP DATE BLD: NORMAL
SQUAMOUS EPITHELIAL: 1 /HPF
UROBILINOGEN UR STRIP-MCNC: NORMAL MG/DL
WBC # BLD AUTO: 7.9 10E3/UL (ref 4–11)
WBC URINE: 2 /HPF

## 2025-04-20 PROCEDURE — 96374 THER/PROPH/DIAG INJ IV PUSH: CPT

## 2025-04-20 PROCEDURE — 80048 BASIC METABOLIC PNL TOTAL CA: CPT | Performed by: EMERGENCY MEDICINE

## 2025-04-20 PROCEDURE — 250N000011 HC RX IP 250 OP 636: Mod: JZ

## 2025-04-20 PROCEDURE — 81001 URINALYSIS AUTO W/SCOPE: CPT | Performed by: EMERGENCY MEDICINE

## 2025-04-20 PROCEDURE — 86900 BLOOD TYPING SEROLOGIC ABO: CPT | Performed by: EMERGENCY MEDICINE

## 2025-04-20 PROCEDURE — 36415 COLL VENOUS BLD VENIPUNCTURE: CPT | Performed by: EMERGENCY MEDICINE

## 2025-04-20 PROCEDURE — 84702 CHORIONIC GONADOTROPIN TEST: CPT | Performed by: EMERGENCY MEDICINE

## 2025-04-20 PROCEDURE — 85004 AUTOMATED DIFF WBC COUNT: CPT | Performed by: EMERGENCY MEDICINE

## 2025-04-20 PROCEDURE — 99284 EMERGENCY DEPT VISIT MOD MDM: CPT | Mod: 25

## 2025-04-20 RX ORDER — KETOROLAC TROMETHAMINE 15 MG/ML
15 INJECTION, SOLUTION INTRAMUSCULAR; INTRAVENOUS ONCE
Status: COMPLETED | OUTPATIENT
Start: 2025-04-20 | End: 2025-04-20

## 2025-04-20 RX ORDER — KETOROLAC TROMETHAMINE 15 MG/ML
15 INJECTION, SOLUTION INTRAMUSCULAR; INTRAVENOUS ONCE
Status: DISCONTINUED | OUTPATIENT
Start: 2025-04-20 | End: 2025-04-20

## 2025-04-20 RX ADMIN — KETOROLAC TROMETHAMINE 15 MG: 15 INJECTION, SOLUTION INTRAMUSCULAR; INTRAVENOUS at 23:18

## 2025-04-20 ASSESSMENT — ACTIVITIES OF DAILY LIVING (ADL): ADLS_ACUITY_SCORE: 41

## 2025-04-21 VITALS
HEIGHT: 69 IN | DIASTOLIC BLOOD PRESSURE: 63 MMHG | RESPIRATION RATE: 16 BRPM | SYSTOLIC BLOOD PRESSURE: 103 MMHG | WEIGHT: 170 LBS | HEART RATE: 67 BPM | OXYGEN SATURATION: 100 % | BODY MASS INDEX: 25.18 KG/M2 | TEMPERATURE: 98.7 F

## 2025-04-21 NOTE — ED TRIAGE NOTES
Patient presents to ED with vaginal bleeding that started PTA, patient found out Friday that she is pregnant, having some cramping.  Naomi Romeo RN.......4/20/2025 9:12 PM     Triage Assessment (Adult)       Row Name 04/20/25 2111          Triage Assessment    Airway WDL WDL        Respiratory WDL    Respiratory WDL WDL        Skin Circulation/Temperature WDL    Skin Circulation/Temperature WDL WDL        Cardiac WDL    Cardiac WDL WDL        Peripheral/Neurovascular WDL    Peripheral Neurovascular WDL WDL        Cognitive/Neuro/Behavioral WDL    Cognitive/Neuro/Behavioral WDL WDL

## 2025-04-21 NOTE — ED PROVIDER NOTES
"Emergency Department Midlevel Supervisory Note     I had a face to face encounter with this patient seen by the Advanced Practice Provider (ANGELA). I personally made/approved the management plan and take responsibility for the patient management. I personally saw patient and performed a substantive portion of the visit including all aspects of the medical decision making.     ED Course:  11:15 PM Hillary Ruiz PA-C staffed patient with me. I agree with their assessment and plan of management, and I will see the patient.  11:17 PM I met with the patient to introduce myself, gather additional history, perform my initial exam, and discuss the plan.     Brief HPI:     Rome Jensen is a 25 year old female who presents for evaluation of vaginal bleeding while pregnant     \"Patient endorses she received a call 2 days ago that her pregnancy test was positive. She believes it may have been 3 weeks. She was given Plan B at recent [facility] ED visit on 4/15/25. Today, she endorses vaginal bleeding of clumps and abdominal cramping 30 minutes PTA. She reports this heavier than her normal menstrual period. She also notes some shortness of breath with the pain. Denies any fever, chest pain, nausea, or vomiting. No history of ovarian cysts. No other complaints or concerns at this time.\"    I, Valerie Mora, am serving as a scribe to document services personally performed by Kristen Rosado MD, based on my observations and the provider's statements to me.   I, Kristen Rosado MD attest that Valerie Mora was acting in a scribe capacity, has observed my performance of the services and has documented them in accordance with my direction.    Brief Physical Exam: /73   Pulse 72   Temp 98.7  F (37.1  C) (Oral)   Resp 16   Ht 1.753 m (5' 9\")   Wt 77.1 kg (170 lb)   LMP 03/25/2025   SpO2 100%   BMI 25.10 kg/m    Constitutional:  Alert, in no acute distress  EYES: Conjunctivae clear  HENT:  Atraumatic  Respiratory:  Respirations even, " unlabored, in no acute respiratory distress  Cardiovascular:  Regular rate and rhythm, good peripheral perfusion  GI: Soft, non-distended, non-tender  Musculoskeletal:  Moves all 4 extremities equally, grossly symmetrical strength  Integument: Warm & dry. No appreciable rash, erythema.  Neurologic:  Alert & oriented, speech clear and fluent, no focal deficits noted  Psych: Normal mood and affect       MDM:  ***       No diagnosis found.    Consults:  I discussed the patient with *** who recommends ***    Labs and Imaging:  Results for orders placed or performed during the hospital encounter of 04/20/25   HCG quantitative pregnancy (blood)   Result Value Ref Range    hCG Quantitative <1 <5 mIU/mL   Basic metabolic panel   Result Value Ref Range    Sodium 139 135 - 145 mmol/L    Potassium 4.2 3.4 - 5.3 mmol/L    Chloride 103 98 - 107 mmol/L    Carbon Dioxide (CO2) 26 22 - 29 mmol/L    Anion Gap 10 7 - 15 mmol/L    Urea Nitrogen 9.2 6.0 - 20.0 mg/dL    Creatinine 0.62 0.51 - 0.95 mg/dL    GFR Estimate >90 >60 mL/min/1.73m2    Calcium 9.3 8.8 - 10.4 mg/dL    Glucose 87 70 - 99 mg/dL   UA with Microscopic reflex to Culture    Specimen: Urine, Clean Catch   Result Value Ref Range    Color Urine Light Yellow Colorless, Straw, Light Yellow, Yellow    Appearance Urine Clear Clear    Glucose Urine Negative Negative mg/dL    Bilirubin Urine Negative Negative    Ketones Urine Negative Negative mg/dL    Specific Gravity Urine 1.021 1.001 - 1.030    Blood Urine 0.5 mg/dL (A) Negative    pH Urine 5.5 5.0 - 7.0    Protein Albumin Urine Negative Negative mg/dL    Urobilinogen Urine Normal Normal mg/dL    Nitrite Urine Negative Negative    Leukocyte Esterase Urine Negative Negative    Mucus Urine Present (A) None Seen /LPF    RBC Urine 24 (H) <=2 /HPF    WBC Urine 2 <=5 /HPF    Squamous Epithelials Urine 1 <=1 /HPF   CBC with platelets and differential   Result Value Ref Range    WBC Count 7.9 4.0 - 11.0 10e3/uL    RBC Count 4.91  3.80 - 5.20 10e6/uL    Hemoglobin 14.1 11.7 - 15.7 g/dL    Hematocrit 42.1 35.0 - 47.0 %    MCV 86 78 - 100 fL    MCH 28.7 26.5 - 33.0 pg    MCHC 33.5 31.5 - 36.5 g/dL    RDW 13.7 10.0 - 15.0 %    Platelet Count 180 150 - 450 10e3/uL    % Neutrophils 60 %    % Lymphocytes 31 %    % Monocytes 6 %    % Eosinophils 3 %    % Basophils 1 %    % Immature Granulocytes 0 %    NRBCs per 100 WBC 0 <1 /100    Absolute Neutrophils 4.7 1.6 - 8.3 10e3/uL    Absolute Lymphocytes 2.4 0.8 - 5.3 10e3/uL    Absolute Monocytes 0.4 0.0 - 1.3 10e3/uL    Absolute Eosinophils 0.3 0.0 - 0.7 10e3/uL    Absolute Basophils 0.0 0.0 - 0.2 10e3/uL    Absolute Immature Granulocytes 0.0 <=0.4 10e3/uL    Absolute NRBCs 0.0 10e3/uL   Adult Type and Screen   Result Value Ref Range    ABO/RH(D) O POS     Antibody Screen Negative Negative    SPECIMEN EXPIRATION DATE [id number]        I have reviewed the relevant laboratory studies above.    I independently interpreted the following imaging study(s):   ***    EKG: I reviewed and independently interpreted the patient's EKG, with comments made as listed below. Please see scanned EKG for full report.   ***    Procedures:  I was present for the key portions of procedures documented in ANGELA/midlevel note, see midlevel note for further details.    Kristen Rosado MD  [facility] EMERGENCY ROOM  [address] [id number] [phone]

## 2025-04-21 NOTE — ED PROVIDER NOTES
Emergency Department Encounter   NAME: Rome Jensen ; AGE: 25 year old female ; YOB: 2000 ; MRN: [id number] ; PCP: Avila Villagran   ED PROVIDER: Hillary Ruiz PA-C    Evaluation Date & Time:   No admission date for patient encounter.    CHIEF COMPLAINT:  Abdominal Pain and Vaginal Bleeding - Pregnant      Impression and Plan   MDM: Rome Jensen is a 25 year old female who presents to the ED for evaluation of vaginal bleeding. Patient endorses she received a call 2 days ago that her pregnancy test was positive. She believes it may have been 3 weeks. She was given Plan B at recent [facility] ED visit on 4/15/25. Today, she endorses vaginal bleeding of clumps and abdominal cramping 30 minutes PTA. She reports this heavier than her normal menstrual period. She also notes some shortness of breath with the pain. Denies any fever, chest pain, nausea, or vomiting. No history of ovarian cysts. No other complaints or concerns at this time.    Vitals reviewed and patient is afebrile, not tachycardic or tachypneic, and normotensive. On exam patient has abdominal tenderness to palpation of suprapubic area and RLQ.      I discussed with the patient that her initial pregnancy test was negative, and her repeat today was <1, so she was never pregnant. Because of this, her abdominal cramping and bleeding are most likely a normal side effect from the plan B. General lab workup is negative for any UTI, leukocytosis, or anemia from her bleeding. I discussed this with the patient and stated that she can cancel her  appointment tomorrow. Patient given toradol in the ED for pain and I recommended that she take ibuprofen or tylenol as needed for pain at home. She can use a heating pad or ice as well if that helps her pain.       ED COURSE:  10:48 PM I met and introduced myself to the patient. I gathered initial history and performed my physical exam. We discussed plan for initial workup.   11:15 PM I have  staffed the patient with Dr. Rosado, ED MD, who has evaluated the patient and agrees with all aspects of today's care.   I rechecked the patient and discussed results, discharge, follow up, and reasons to return to the ED.     At the conclusion of the encounter I discussed the results of all the tests and the disposition. The questions were answered. The patient or family acknowledged understanding and was agreeable with the care plan.    Medical Decision Making  Discharge. No recommendations on prescription strength medication(s). See documentation for any additional details.    MIPS (CTPE, Dental pain, Ceja, Sinusitis, Asthma/COPD, Head Trauma): Not Applicable    SEPSIS: None        FINAL IMPRESSION:    ICD-10-CM    1. Vaginal bleeding  N93.9             MEDICATIONS GIVEN IN THE EMERGENCY DEPARTMENT:  Medications   ketorolac (TORADOL) injection 15 mg (15 mg Intravenous $Given 4/20/25 3960)         NEW PRESCRIPTIONS STARTED AT TODAY'S ED VISIT:  Discharge Medication List as of 4/21/2025 12:01 AM            HPI   Use of Intrepreter: N/A     Rome JUAN Jensen is a 25 year old female with no pertinent history who presents to the ED for evaluation of vaginal bleeding and abdominal pain.     Patient endorses she received a call 2 days ago that her pregnancy test was positive. She believes it may have been 3 weeks. She was given Plan B at recent [facility] ED visit on 4/15/25. Today, she endorses vaginal bleeding of clumps and abdominal cramping 30 minutes PTA. She reports this heavier than her normal menstrual period. She also notes some shortness of breath with the pain. Denies any fever, chest pain, nausea, or vomiting. No history of ovarian cysts. No other complaints or concerns at this time.    Per chart review, patient visited [facility] ED from 4/14/25 to 4/15/25 regarding vaginal discharge. Unprotected sex about 48 hours ago. Prescribe Plan B and prophylaxis against gonorrhea and chlamydia. [facility]  [facility] or [facility]CP for further testing.     REVIEW OF SYSTEMS:  Pertinent positive and negative symptoms per HPI.       Medical History     Past Medical History:   Diagnosis Date    Asthma        No past surgical history on file.    No family history on file.    Social History     Tobacco Use    Smoking status: Every Day     Types: Vaping Device    Smokeless tobacco: Never   Substance Use Topics    Alcohol use: Not Currently     Alcohol/week: 19.0 standard drinks of alcohol    Drug use: Not Currently     Types: Marijuana       chlorhexidine (PERIDEX) 0.12 % solution  doxycycline hyclate (VIBRA-TABS) 100 MG tablet  tiZANidine (ZANAFLEX) 4 MG tablet          Physical Exam     First Vitals:  Patient Vitals for the past 24 hrs:   BP Temp Temp src Pulse Resp SpO2 Height Weight   04/21/25 0000 103/63 -- -- -- -- -- -- --   04/20/25 2359 -- -- -- 67 -- 100 % -- --   04/20/25 2321 109/75 -- -- 64 -- 99 % -- --   04/20/25 2110 115/73 98.7  F (37.1  C) Oral 72 16 100 % 1.753 m (5' 9\") 77.1 kg (170 lb)         PHYSICAL EXAM:   Physical Exam  Constitutional:       General: She is not in acute distress.     Appearance: Normal appearance. She is not diaphoretic.   HENT:      Head: Atraumatic.      Mouth/Throat:      Mouth: Mucous membranes are moist.   Eyes:      General: No scleral icterus.     Conjunctiva/sclera: Conjunctivae normal.   Pulmonary:      Effort: Pulmonary effort is normal. No respiratory distress.   Abdominal:      General: Abdomen is flat.      Tenderness: There is abdominal tenderness (suprapubic and RLQ tenderness).   Musculoskeletal:         General: Normal range of motion.      Cervical back: Neck supple.   Skin:     General: Skin is warm.      Findings: No rash.   Neurological:      General: No focal deficit present.      Mental Status: She is alert and oriented to person, place, and time.             Results     LAB:  All pertinent labs reviewed and interpreted  Labs Ordered and Resulted from Time of " ED Arrival to Time of ED Departure   ROUTINE UA WITH MICROSCOPIC REFLEX TO CULTURE - Abnormal       Result Value    Color Urine Light Yellow      Appearance Urine Clear      Glucose Urine Negative      Bilirubin Urine Negative      Ketones Urine Negative      Specific Gravity Urine 1.021      Blood Urine 0.5 mg/dL (*)     pH Urine 5.5      Protein Albumin Urine Negative      Urobilinogen Urine Normal      Nitrite Urine Negative      Leukocyte Esterase Urine Negative      Mucus Urine Present (*)     RBC Urine 24 (*)     WBC Urine 2      Squamous Epithelials Urine 1     HCG QUANTITATIVE PREGNANCY - Normal    hCG Quantitative <1     BASIC METABOLIC PANEL - Normal    Sodium 139      Potassium 4.2      Chloride 103      Carbon Dioxide (CO2) 26      Anion Gap 10      Urea Nitrogen 9.2      Creatinine 0.62      GFR Estimate >90      Calcium 9.3      Glucose 87     CBC WITH PLATELETS AND DIFFERENTIAL    WBC Count 7.9      RBC Count 4.91      Hemoglobin 14.1      Hematocrit 42.1      MCV 86      MCH 28.7      MCHC 33.5      RDW 13.7      Platelet Count 180      % Neutrophils 60      % Lymphocytes 31      % Monocytes 6      % Eosinophils 3      % Basophils 1      % Immature Granulocytes 0      NRBCs per 100 WBC 0      Absolute Neutrophils 4.7      Absolute Lymphocytes 2.4      Absolute Monocytes 0.4      Absolute Eosinophils 0.3      Absolute Basophils 0.0      Absolute Immature Granulocytes 0.0      Absolute NRBCs 0.0     TYPE AND SCREEN, ADULT    ABO/RH(D) O POS      Antibody Screen Negative      SPECIMEN EXPIRATION DATE [id number]     ABO/RH TYPE AND SCREEN       RADIOLOGY:  No orders to display         PROCEDURES:  none      Karissa ESTEBAN, am serving as a scribe to document services personally performed by Hillary Ruiz PA-C, based on my observation and the provider's statements to me. IHillary PA-C attest that Karissa Cisneros is acting in a scribe capacity, has observed my performance of the services and has  documented them in accordance with my direction.       Hillary Ruiz PA-C   Emergency Medicine   [facility] EMERGENCY ROOM      Hillary Ruiz PA-C  04/21/25 0007

## 2025-04-21 NOTE — DISCHARGE INSTRUCTIONS
Emergency Department evaluation today is reassuring.  Please continue taking Tylenol or ibuprofen as needed and follow up with your PCP.

## 2025-07-24 ENCOUNTER — HOSPITAL ENCOUNTER (EMERGENCY)
Facility: CLINIC | Age: 25
Discharge: HOME | End: 2025-07-24
Payer: MEDICAID

## 2025-07-24 VITALS
TEMPERATURE: 98 F | BODY MASS INDEX: 26.66 KG/M2 | HEIGHT: 69 IN | DIASTOLIC BLOOD PRESSURE: 68 MMHG | WEIGHT: 180 LBS | HEART RATE: 104 BPM | SYSTOLIC BLOOD PRESSURE: 128 MMHG | OXYGEN SATURATION: 98 % | RESPIRATION RATE: 18 BRPM

## 2025-07-24 DIAGNOSIS — Z11.3 SCREEN FOR STD (SEXUALLY TRANSMITTED DISEASE): ICD-10-CM
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
## 2025-07-24 DIAGNOSIS — S00.83XA TRAUMATIC HEMATOMA OF FOREHEAD, INITIAL ENCOUNTER: ICD-10-CM

## 2025-07-24 DIAGNOSIS — S09.90XA CLOSED HEAD INJURY, INITIAL ENCOUNTER: ICD-10-CM

## 2025-07-24 LAB
ALBUMIN UR-MCNC: NEGATIVE MG/DL
APPEARANCE UR: CLEAR
BILIRUB UR QL STRIP: NEGATIVE
CLUE CELLS: ABNORMAL
COLOR UR AUTO: YELLOW
GLUCOSE UR STRIP-MCNC: NEGATIVE MG/DL
HGB UR QL STRIP: NEGATIVE
KETONES UR STRIP-MCNC: NEGATIVE MG/DL
LEUKOCYTE ESTERASE UR QL STRIP: NEGATIVE
MUCOUS THREADS #/AREA URNS LPF: PRESENT /LPF
NITRATE UR QL: NEGATIVE
PH UR STRIP: 5.5 [PH] (ref 5–7)
RBC URINE: 0 /HPF
SP GR UR STRIP: 1.03 (ref 1–1.03)
SQUAMOUS EPITHELIAL: 1 /HPF
TRICHOMONAS, WET PREP: ABNORMAL
UROBILINOGEN UR STRIP-MCNC: NORMAL MG/DL
WBC URINE: 1 /HPF
WBC'S/HIGH POWER FIELD, WET PREP: ABNORMAL
YEAST, WET PREP: ABNORMAL

## 2025-07-24 PROCEDURE — 87591 N.GONORRHOEAE DNA AMP PROB: CPT

## 2025-07-24 PROCEDURE — 87210 SMEAR WET MOUNT SALINE/INK: CPT

## 2025-07-24 PROCEDURE — 81001 URINALYSIS AUTO W/SCOPE: CPT | Performed by: EMERGENCY MEDICINE

## 2025-07-24 PROCEDURE — 87491 CHLMYD TRACH DNA AMP PROBE: CPT

## 2025-07-24 PROCEDURE — 99283 EMERGENCY DEPT VISIT LOW MDM: CPT

## 2025-07-24 NOTE — ED TRIAGE NOTES
Pt presents to the ED with c/o fall last night. Fell forward and hit forehead on concrete. Denies LOC  or thinners. Denies cervical tenderness. Pt also has c/o right wrist pain and right knee pain. Abrasion on right knee. Pt also has concerns for possible Std and wants to be tested.      Triage Assessment (Adult)       Row Name 07/24/25 1240          Triage Assessment    Airway WDL WDL        Respiratory WDL    Respiratory WDL WDL        Skin Circulation/Temperature WDL    Skin Circulation/Temperature WDL WDL        Cardiac WDL    Cardiac WDL WDL        Peripheral/Neurovascular WDL    Peripheral Neurovascular WDL WDL        Cognitive/Neuro/Behavioral WDL    Cognitive/Neuro/Behavioral WDL WDL

## 2025-07-24 NOTE — PROGRESS NOTES
Patient did not wait for discharge paperwork. Patient was not in the lobby when called. [facility] staff said she did not notify staff that she was leaving.

## 2025-07-24 NOTE — ED PROVIDER NOTES
"Emergency Department Encounter   NAME: Rome Jensen  AGE: 25 year old female   YOB: 2000 ;   MRN: [id number] ;    ED PROVIDER: Rosio Piper PA-C    PCP: Avila Villagran    Evaluation Date & Time:   7/24/25 8947    CHIEF COMPLAINT:  Head Injury and Fall      FINAL IMPRESSION:    ICD-10-CM    1. Fall down stairs, initial encounter  W10.8XXA       2. Closed head injury, initial encounter  S09.90XA Concussion  Referral      3. Traumatic hematoma of forehead, initial encounter  S00.83XA Concussion  Referral      4. Screen for STD (sexually transmitted disease)  Z11.3             IMPRESSION AND PLAN   MDM: Rome Jensen is a 25 year old female with a pertinent history of bipolar disorder who presents to the ED by walk-in for evaluation of head injury after a fall. Patient reports that last night she missed a step and fell down the last step, hitting the front of her head on the concrete. She denies LOC, she is not on blood thinners.    Vitals stable. On exam patient is irritable and is wanting to go home. There is a large hematoma to the R forehead that is reproducibly tender to palpation. Cranial nerves otherwise intact. Cervical spine nontender, ROM intact. Patient also endorses dizziness, lightheadedness, nausea, headaches. Symptoms seem most consistent with a concussion however, I did offer CT to rule out intracranial abnormality vs skull fracture although I have low suspicion for these. Patient declined CT at this time so I educated patient on signs/symptoms of concussion and ways to manage them at home. I also provided a referral to the concussion clinic for further evaluation and management if symptoms persist.     Patient is also requesting STD testing today. She declines concern for STD, she states she \"just wants to be checked\". She denies any pelvic pain or changes in vaginal discharge. UA was collected to test for gonorrhea/chlamydia. She declined pregnancy testing " today. Wet prep performed as well. Patient still requesting discharge despite wet prep results not being finalized. I discussed with her that I will call her with the results and that she will be notified of gonorrhea/chlamydia results if they are positive. I discussed strict return precautions regarding the head injury to which she expressed her understanding. All questions and concerns addressed. Patient is overall agreeable to this plan and feels comfortable with discharge at this time.      MIPS (CTPE, Dental pain, Ceja, Sinusitis, Asthma/COPD, Head Trauma): Adult Minor Head Trauma:Head CT NOT indicated - no high risk factors    SEPSIS: None      ED COURSE:  1:54 PM I met and introduced myself to the patient. I gathered initial history and performed my physical exam. We discussed plan for initial workup. She requests discharge at this time without head imaging. She does request STD and yeast testing. We discussed discharge, follow up, and reasons to return to the ED.           MEDICATIONS GIVEN IN THE EMERGENCY DEPARTMENT:  Medications - No data to display      NEW PRESCRIPTIONS STARTED AT TODAY'S ED VISIT:  Discharge Medication List as of 7/24/2025  2:13 PM            BRIEF HPI   Patient information was obtained from: patient   Use of Intrepreter: N/A     Rome Jensen is a 25 year old female with a pertinent history of alcohol use disorder, bipolar disorder with affective psychosis, cannabis dependence, suicidal ideation, and donn who presents to the ED by walk-in for evaluation of head injury and fall.     Patient reports missing a step yesterday and falling face first onto concrete. It is unknown if alcohol was involved. She is unsure if she lost consciousness. She now reports nausea, dizziness, headache, fatigue, blurry vision, and foggy memory. She tried ice on her head but has not used any pain medicine. She denies taking anti-coagulants. Declines any vaginal or urinary symptoms.     Patient declines  "imaging, instead only asking for STD and yeast infection testing and discharge. She states that she is tired and hungry and that the wait is too long. She prefers to discharge home.    REVIEW OF SYSTEMS:  Pertinent positive and negative symptoms per HPI.       MEDICAL HISTORY     Past Medical History:   Diagnosis Date    Asthma        No past surgical history on file.    No family history on file.    Social History     Tobacco Use    Smoking status: Every Day     Types: Vaping Device    Smokeless tobacco: Never   Substance Use Topics    Alcohol use: Not Currently     Alcohol/week: 19.0 standard drinks of alcohol    Drug use: Not Currently     Types: Marijuana         PHYSICAL EXAM     First Vitals:  Patient Vitals for the past 24 hrs:   BP Temp Temp src Pulse Resp SpO2 Height Weight   07/24/25 1239 128/68 98  F (36.7  C) Temporal 104 18 98 % 1.753 m (5' 9\") 81.6 kg (180 lb)       PHYSICAL EXAM:  Physical Exam  Vitals and nursing note reviewed.   Constitutional:       General: She is not in acute distress.     Appearance: Normal appearance. She is normal weight.   HENT:      Head: Contusion (Large hematoma to the R forehead, tender to palpation) present. No raccoon eyes, Nascimento's sign, right periorbital erythema or left periorbital erythema.      Nose: Nose normal.      Mouth/Throat:      Mouth: Mucous membranes are moist.   Eyes:      General: Lids are normal.      Extraocular Movements: Extraocular movements intact.      Conjunctiva/sclera: Conjunctivae normal.      Pupils: Pupils are equal, round, and reactive to light.   Cardiovascular:      Rate and Rhythm: Normal rate.   Pulmonary:      Effort: Pulmonary effort is normal.   Musculoskeletal:      Cervical back: Normal range of motion and neck supple. No tenderness. No spinous process tenderness or muscular tenderness. Normal range of motion.   Skin:     General: Skin is warm and dry.   Neurological:      General: No focal deficit present.      Mental Status: She " is oriented to person, place, and time. She is lethargic.      Cranial Nerves: No cranial nerve deficit.      Sensory: No sensory deficit.      Motor: No weakness.   Psychiatric:         Mood and Affect: Affect is blunt and flat.          RESULTS     LAB:  All pertinent labs reviewed and interpreted  Labs Ordered and Resulted from Time of ED Arrival to Time of ED Departure - No data to display      RADIOLOGY:  No orders to display         IMahnaz am serving as a scribe to document services personally performed by Rosio Piper PA-C, based on my observation and the provider's statements to me. I, Rosio Piper PA-C attest that Mahnaz Caraballo is acting in a scribe capacity, has observed my performance of the services and has documented them in accordance with my direction.       Rosio Piper PA-C  Emergency Medicine   [facility] EMERGENCY ROOM       Rosio Piper PA-C  07/24/25 151       Rosio Piper PA-C  07/24/25 1701

## 2025-07-24 NOTE — DISCHARGE INSTRUCTIONS
Based on your fall and symptoms, I suspect you have a concussion. Healing from a concussion can take a while. Symptoms include headache, nausea, vomiting, sensitivity to bright lights/loud noises, trouble concentrating. You will need to give your brain time to heal but minimizing stimulation including decreasing the amount of time you watch tv, read, sit on your phone, etc. I've provided a referral to the concussion clinic for management if your symptoms persist. Additionally, I will call you with results of the swab today. You will only get a call about your urine results if something is positive. Please return to the ED for any new or worsening symptoms.

## 2025-07-24 NOTE — ED NOTES
3:03 PM I called patient and informed her of the negative wet prep and UA. She expressed her understanding and does not have any further questions or concerns at this time.      Wet prep    Final resultCollected 07/24 13:57  Trichomonas Absent   Yeast Absent   Clue cells Absent   WBCs/high power field 1+     UA with Microscopic reflex to Culture    Final resultCollected 07/24 13:52  Color Urine Yellow   Appearance Urine Clear   Glucose Urine Negative   Bilirubin Urine Negative   Ketones Urine Negative   Specific Gravity Urine 1.027   Blood Urine Negative   pH Urine 5.5   Protein Albumin Urine Negative   Urobilinogen mg/dL Normal   Nitrite Urine Negative   Leukocyte Esterase Urine Negative   Mucus Urine Present   RBC Urine 0   WBC Urine 1   Squamous Epithelial /HPF Urine 1          Rosio Piper PA-C  07/24/25 1503       Rosio Piper PA-C  07/24/25 1524

## 2025-07-25 LAB
C TRACH DNA SPEC QL NAA+PROBE: NEGATIVE
N GONORRHOEA DNA SPEC QL NAA+PROBE: NEGATIVE
SPECIMEN TYPE: NORMAL
SPECIMEN TYPE: NORMAL